# Patient Record
Sex: FEMALE | Race: WHITE | NOT HISPANIC OR LATINO | Employment: UNEMPLOYED | URBAN - METROPOLITAN AREA
[De-identification: names, ages, dates, MRNs, and addresses within clinical notes are randomized per-mention and may not be internally consistent; named-entity substitution may affect disease eponyms.]

---

## 2023-10-03 ENCOUNTER — LAB (OUTPATIENT)
Dept: LAB | Facility: LAB | Age: 28
End: 2023-10-03
Payer: COMMERCIAL

## 2023-10-03 DIAGNOSIS — O16.2 UNSPECIFIED MATERNAL HYPERTENSION, SECOND TRIMESTER (HHS-HCC): Primary | ICD-10-CM

## 2023-10-03 LAB
ERYTHROCYTE [DISTWIDTH] IN BLOOD BY AUTOMATED COUNT: 12.5 % (ref 11.5–14.5)
HCT VFR BLD AUTO: 35.5 % (ref 36–46)
HGB BLD-MCNC: 11.7 G/DL (ref 12–16)
MCH RBC QN AUTO: 27.9 PG (ref 26–34)
MCHC RBC AUTO-ENTMCNC: 33 G/DL (ref 32–36)
MCV RBC AUTO: 85 FL (ref 80–100)
NRBC BLD-RTO: 0 /100 WBCS (ref 0–0)
PLATELET # BLD AUTO: 222 X10*3/UL (ref 150–450)
PMV BLD AUTO: 11.9 FL (ref 7.5–11.5)
RBC # BLD AUTO: 4.19 X10*6/UL (ref 4–5.2)
WBC # BLD AUTO: 16.6 X10*3/UL (ref 4.4–11.3)

## 2023-10-03 PROCEDURE — 82565 ASSAY OF CREATININE: CPT

## 2023-10-03 PROCEDURE — 84156 ASSAY OF PROTEIN URINE: CPT

## 2023-10-03 PROCEDURE — 82570 ASSAY OF URINE CREATININE: CPT

## 2023-10-03 PROCEDURE — 84520 ASSAY OF UREA NITROGEN: CPT

## 2023-10-03 PROCEDURE — 36415 COLL VENOUS BLD VENIPUNCTURE: CPT

## 2023-10-03 PROCEDURE — 84450 TRANSFERASE (AST) (SGOT): CPT

## 2023-10-03 PROCEDURE — 84460 ALANINE AMINO (ALT) (SGPT): CPT

## 2023-10-03 PROCEDURE — 84550 ASSAY OF BLOOD/URIC ACID: CPT

## 2023-10-04 LAB
ALT SERPL W P-5'-P-CCNC: 7 U/L (ref 7–45)
AST SERPL W P-5'-P-CCNC: 11 U/L (ref 9–39)
BUN SERPL-MCNC: 16 MG/DL (ref 6–23)
CREAT SERPL-MCNC: 0.84 MG/DL (ref 0.5–1.05)
CREAT UR-MCNC: 69.9 MG/DL (ref 20–320)
GFR SERPL CREATININE-BSD FRML MDRD: >90 ML/MIN/1.73M*2
PROT UR-ACNC: 7 MG/DL (ref 5–24)
PROT/CREAT UR: 0.1 MG/MG CREAT (ref 0–0.17)
URATE SERPL-MCNC: 7.3 MG/DL (ref 2.3–6.7)

## 2023-11-06 ENCOUNTER — HOSPITAL ENCOUNTER (OUTPATIENT)
Facility: HOSPITAL | Age: 28
Discharge: HOME | End: 2023-11-06
Attending: STUDENT IN AN ORGANIZED HEALTH CARE EDUCATION/TRAINING PROGRAM | Admitting: STUDENT IN AN ORGANIZED HEALTH CARE EDUCATION/TRAINING PROGRAM
Payer: COMMERCIAL

## 2023-11-06 ENCOUNTER — HOSPITAL ENCOUNTER (OUTPATIENT)
Facility: HOSPITAL | Age: 28
End: 2023-11-06
Attending: STUDENT IN AN ORGANIZED HEALTH CARE EDUCATION/TRAINING PROGRAM | Admitting: STUDENT IN AN ORGANIZED HEALTH CARE EDUCATION/TRAINING PROGRAM
Payer: COMMERCIAL

## 2023-11-06 VITALS
DIASTOLIC BLOOD PRESSURE: 86 MMHG | SYSTOLIC BLOOD PRESSURE: 143 MMHG | OXYGEN SATURATION: 97 % | TEMPERATURE: 98.2 F | RESPIRATION RATE: 18 BRPM | HEART RATE: 80 BPM

## 2023-11-06 LAB
ALBUMIN SERPL-MCNC: 3.4 G/DL (ref 3.5–5)
ALP BLD-CCNC: 91 U/L (ref 35–125)
ALT SERPL-CCNC: 12 U/L (ref 5–40)
ANION GAP SERPL CALC-SCNC: 9 MMOL/L
AST SERPL-CCNC: 16 U/L (ref 5–40)
BILIRUB SERPL-MCNC: <0.2 MG/DL (ref 0.1–1.2)
BUN SERPL-MCNC: 12 MG/DL (ref 8–25)
CALCIUM SERPL-MCNC: 8.9 MG/DL (ref 8.5–10.4)
CHLORIDE SERPL-SCNC: 102 MMOL/L (ref 97–107)
CO2 SERPL-SCNC: 24 MMOL/L (ref 24–31)
CREAT SERPL-MCNC: 0.9 MG/DL (ref 0.4–1.6)
CREAT UR-MCNC: 182.4 MG/DL
ERYTHROCYTE [DISTWIDTH] IN BLOOD BY AUTOMATED COUNT: 12.5 % (ref 11.5–14.5)
GFR SERPL CREATININE-BSD FRML MDRD: >90 ML/MIN/1.73M*2
GLUCOSE SERPL-MCNC: 107 MG/DL (ref 65–99)
HCT VFR BLD AUTO: 32.3 % (ref 36–46)
HGB BLD-MCNC: 10.8 G/DL (ref 12–16)
LDH SERPL-CCNC: 178 U/L (ref 91–227)
MCH RBC QN AUTO: 28.3 PG (ref 26–34)
MCHC RBC AUTO-ENTMCNC: 33.4 G/DL (ref 32–36)
MCV RBC AUTO: 85 FL (ref 80–100)
NRBC BLD-RTO: 0 /100 WBCS (ref 0–0)
PLATELET # BLD AUTO: 210 X10*3/UL (ref 150–450)
POTASSIUM SERPL-SCNC: 3.7 MMOL/L (ref 3.4–5.1)
PROT SERPL-MCNC: 5.7 G/DL (ref 5.9–7.9)
PROT UR-MCNC: 22 MG/DL
PROT/CREAT UR: 0.12 MG/MG CREAT
RBC # BLD AUTO: 3.82 X10*6/UL (ref 4–5.2)
SODIUM SERPL-SCNC: 135 MMOL/L (ref 133–145)
WBC # BLD AUTO: 12.8 X10*3/UL (ref 4.4–11.3)

## 2023-11-06 PROCEDURE — 99213 OFFICE O/P EST LOW 20 MIN: CPT

## 2023-11-06 PROCEDURE — 83615 LACTATE (LD) (LDH) ENZYME: CPT | Performed by: STUDENT IN AN ORGANIZED HEALTH CARE EDUCATION/TRAINING PROGRAM

## 2023-11-06 PROCEDURE — 85027 COMPLETE CBC AUTOMATED: CPT | Performed by: STUDENT IN AN ORGANIZED HEALTH CARE EDUCATION/TRAINING PROGRAM

## 2023-11-06 PROCEDURE — 36415 COLL VENOUS BLD VENIPUNCTURE: CPT | Performed by: STUDENT IN AN ORGANIZED HEALTH CARE EDUCATION/TRAINING PROGRAM

## 2023-11-06 PROCEDURE — 80053 COMPREHEN METABOLIC PANEL: CPT | Performed by: STUDENT IN AN ORGANIZED HEALTH CARE EDUCATION/TRAINING PROGRAM

## 2023-11-06 PROCEDURE — 84156 ASSAY OF PROTEIN URINE: CPT | Performed by: STUDENT IN AN ORGANIZED HEALTH CARE EDUCATION/TRAINING PROGRAM

## 2023-11-06 PROCEDURE — 82570 ASSAY OF URINE CREATININE: CPT | Performed by: STUDENT IN AN ORGANIZED HEALTH CARE EDUCATION/TRAINING PROGRAM

## 2023-11-06 RX ORDER — LIDOCAINE HYDROCHLORIDE 10 MG/ML
0.5 INJECTION INFILTRATION; PERINEURAL ONCE AS NEEDED
Status: DISCONTINUED | OUTPATIENT
Start: 2023-11-06 | End: 2023-11-06 | Stop reason: HOSPADM

## 2023-11-06 RX ORDER — NIFEDIPINE 30 MG/1
30 TABLET, FILM COATED, EXTENDED RELEASE ORAL
COMMUNITY
End: 2023-12-04 | Stop reason: HOSPADM

## 2023-11-06 RX ORDER — ONDANSETRON HYDROCHLORIDE 2 MG/ML
4 INJECTION, SOLUTION INTRAVENOUS EVERY 6 HOURS PRN
Status: DISCONTINUED | OUTPATIENT
Start: 2023-11-06 | End: 2023-11-06 | Stop reason: HOSPADM

## 2023-11-06 RX ORDER — ONDANSETRON 4 MG/1
4 TABLET, FILM COATED ORAL EVERY 6 HOURS PRN
Status: DISCONTINUED | OUTPATIENT
Start: 2023-11-06 | End: 2023-11-06 | Stop reason: HOSPADM

## 2023-11-06 RX ORDER — LABETALOL HYDROCHLORIDE 5 MG/ML
20 INJECTION, SOLUTION INTRAVENOUS ONCE AS NEEDED
Status: DISCONTINUED | OUTPATIENT
Start: 2023-11-06 | End: 2023-11-06 | Stop reason: HOSPADM

## 2023-11-06 RX ORDER — NIFEDIPINE 10 MG/1
10 CAPSULE ORAL ONCE AS NEEDED
Status: DISCONTINUED | OUTPATIENT
Start: 2023-11-06 | End: 2023-11-06 | Stop reason: HOSPADM

## 2023-11-06 RX ORDER — ASPIRIN 81 MG/1
81 TABLET ORAL DAILY
COMMUNITY
End: 2023-12-04 | Stop reason: HOSPADM

## 2023-11-06 RX ORDER — HYDRALAZINE HYDROCHLORIDE 20 MG/ML
5 INJECTION INTRAMUSCULAR; INTRAVENOUS ONCE AS NEEDED
Status: DISCONTINUED | OUTPATIENT
Start: 2023-11-06 | End: 2023-11-06 | Stop reason: HOSPADM

## 2023-11-06 ASSESSMENT — PAIN SCALES - GENERAL: PAINLEVEL_OUTOF10: 3

## 2023-11-07 NOTE — H&P
Obstetrical Triage History and Physical    27yo  at 31w5d presents for elevated BP at home. Known hypertension starting at 20 weeks, was started on 30mg Nifedipine XL at that time. Reports BP has been mostly mild range at home, gets higher readings at work, was 170 systolic today, prompting presentation. Patient reports mild headache; has also had URI for past several days and has been taking Tylenol Cold&Flu for this. Denies ctx, LOF, VB, vision changes, epigastric pain. +fetal movement.      Obstetrical History   OB History    Para Term  AB Living   2 1   1   1   SAB IAB Ectopic Multiple Live Births           1      # Outcome Date GA Lbr Guillermo/2nd Weight Sex Delivery Anes PTL Lv   2 Current            1                 Past Medical History  Past Medical History:   Diagnosis Date    Acute tonsillitis, unspecified 2013    Acute tonsillitis        Past Surgical History   No past surgical history on file.    Allergies  Patient has no known allergies.     Medications  Nifedipine XL 30mg qAM    Objective    Last Vitals  Temp Pulse Resp BP MAP O2 Sat   36.8 °C (98.2 °F) 80 18 (!) 143/86   97 %     Physical Examination  AAOx3, NAD  Abd soft, gravid, NT  Ext no edema    FHT = Category 1  [place] = quiet      Lab Review  Pre-E labs reviewed and stable from most recent labwork on 10/3. Urine pr:cr = 0.12.    Assessment/Plan  31 weeks with known HTN in pregnancy. As above, preE labs stable from previous. Serial BP during triage observation = 144/90, 145/88, 137/83, 141/85, and 143/86. Patient deemed stable for discharge. She was counseled to keep her scheduled office visit , and was given a note to remain off work until them. Precautions reviewed.

## 2023-11-29 ENCOUNTER — HOSPITAL ENCOUNTER (OUTPATIENT)
Facility: HOSPITAL | Age: 28
Discharge: ANOTHER HOSPITAL | End: 2023-11-29
Attending: STUDENT IN AN ORGANIZED HEALTH CARE EDUCATION/TRAINING PROGRAM | Admitting: STUDENT IN AN ORGANIZED HEALTH CARE EDUCATION/TRAINING PROGRAM
Payer: COMMERCIAL

## 2023-11-29 ENCOUNTER — HOSPITAL ENCOUNTER (INPATIENT)
Facility: HOSPITAL | Age: 28
LOS: 5 days | Discharge: HOME | End: 2023-12-04
Attending: OBSTETRICS & GYNECOLOGY | Admitting: OBSTETRICS & GYNECOLOGY
Payer: COMMERCIAL

## 2023-11-29 VITALS
HEIGHT: 67 IN | WEIGHT: 241.4 LBS | OXYGEN SATURATION: 97 % | HEART RATE: 83 BPM | TEMPERATURE: 98.4 F | RESPIRATION RATE: 18 BRPM | DIASTOLIC BLOOD PRESSURE: 75 MMHG | SYSTOLIC BLOOD PRESSURE: 139 MMHG | BODY MASS INDEX: 37.89 KG/M2

## 2023-11-29 DIAGNOSIS — O14.13 SEVERE PRE-ECLAMPSIA IN THIRD TRIMESTER (HHS-HCC): ICD-10-CM

## 2023-11-29 DIAGNOSIS — O14.10 SEVERE PRE-ECLAMPSIA, ANTEPARTUM (HHS-HCC): ICD-10-CM

## 2023-11-29 LAB
ABO GROUP (TYPE) IN BLOOD: NORMAL
ALBUMIN SERPL-MCNC: 3.3 G/DL (ref 3.5–5)
ALP BLD-CCNC: 101 U/L (ref 35–125)
ALT SERPL-CCNC: 8 U/L (ref 5–40)
ANION GAP SERPL CALC-SCNC: 10 MMOL/L
ANTIBODY SCREEN: NORMAL
AST SERPL-CCNC: 13 U/L (ref 5–40)
BILIRUB SERPL-MCNC: 0.3 MG/DL (ref 0.1–1.2)
BUN SERPL-MCNC: 12 MG/DL (ref 8–25)
CALCIUM SERPL-MCNC: 8.9 MG/DL (ref 8.5–10.4)
CHLORIDE SERPL-SCNC: 105 MMOL/L (ref 97–107)
CO2 SERPL-SCNC: 21 MMOL/L (ref 24–31)
CREAT SERPL-MCNC: 0.9 MG/DL (ref 0.4–1.6)
CREAT UR-MCNC: 97.7 MG/DL
ERYTHROCYTE [DISTWIDTH] IN BLOOD BY AUTOMATED COUNT: 12.7 % (ref 11.5–14.5)
GFR SERPL CREATININE-BSD FRML MDRD: >90 ML/MIN/1.73M*2
GLUCOSE SERPL-MCNC: 85 MG/DL (ref 65–99)
HCT VFR BLD AUTO: 31.9 % (ref 36–46)
HGB BLD-MCNC: 10.5 G/DL (ref 12–16)
LDH SERPL-CCNC: 165 U/L (ref 91–227)
MCH RBC QN AUTO: 28.1 PG (ref 26–34)
MCHC RBC AUTO-ENTMCNC: 32.9 G/DL (ref 32–36)
MCV RBC AUTO: 85 FL (ref 80–100)
NRBC BLD-RTO: 0 /100 WBCS (ref 0–0)
PLATELET # BLD AUTO: 197 X10*3/UL (ref 150–450)
POTASSIUM SERPL-SCNC: 4.1 MMOL/L (ref 3.4–5.1)
PROT SERPL-MCNC: 5.9 G/DL (ref 5.9–7.9)
PROT UR-MCNC: 16 MG/DL
PROT/CREAT UR: 0.16 MG/MG CREAT
RBC # BLD AUTO: 3.74 X10*6/UL (ref 4–5.2)
RH FACTOR (ANTIGEN D): NORMAL
SODIUM SERPL-SCNC: 136 MMOL/L (ref 133–145)
URATE SERPL-MCNC: 8 MG/DL (ref 2.5–6.8)
WBC # BLD AUTO: 14.7 X10*3/UL (ref 4.4–11.3)

## 2023-11-29 PROCEDURE — 2500000004 HC RX 250 GENERAL PHARMACY W/ HCPCS (ALT 636 FOR OP/ED): Performed by: STUDENT IN AN ORGANIZED HEALTH CARE EDUCATION/TRAINING PROGRAM

## 2023-11-29 PROCEDURE — 1120000001 HC OB PRIVATE ROOM DAILY

## 2023-11-29 PROCEDURE — 83615 LACTATE (LD) (LDH) ENZYME: CPT | Performed by: STUDENT IN AN ORGANIZED HEALTH CARE EDUCATION/TRAINING PROGRAM

## 2023-11-29 PROCEDURE — 96372 THER/PROPH/DIAG INJ SC/IM: CPT | Performed by: STUDENT IN AN ORGANIZED HEALTH CARE EDUCATION/TRAINING PROGRAM

## 2023-11-29 PROCEDURE — 87081 CULTURE SCREEN ONLY: CPT | Mod: TRILAB | Performed by: STUDENT IN AN ORGANIZED HEALTH CARE EDUCATION/TRAINING PROGRAM

## 2023-11-29 PROCEDURE — 86901 BLOOD TYPING SEROLOGIC RH(D): CPT | Performed by: STUDENT IN AN ORGANIZED HEALTH CARE EDUCATION/TRAINING PROGRAM

## 2023-11-29 PROCEDURE — 84550 ASSAY OF BLOOD/URIC ACID: CPT | Performed by: STUDENT IN AN ORGANIZED HEALTH CARE EDUCATION/TRAINING PROGRAM

## 2023-11-29 PROCEDURE — 85027 COMPLETE CBC AUTOMATED: CPT | Performed by: STUDENT IN AN ORGANIZED HEALTH CARE EDUCATION/TRAINING PROGRAM

## 2023-11-29 PROCEDURE — 82570 ASSAY OF URINE CREATININE: CPT | Performed by: STUDENT IN AN ORGANIZED HEALTH CARE EDUCATION/TRAINING PROGRAM

## 2023-11-29 PROCEDURE — 80053 COMPREHEN METABOLIC PANEL: CPT | Performed by: STUDENT IN AN ORGANIZED HEALTH CARE EDUCATION/TRAINING PROGRAM

## 2023-11-29 PROCEDURE — 36415 COLL VENOUS BLD VENIPUNCTURE: CPT | Performed by: STUDENT IN AN ORGANIZED HEALTH CARE EDUCATION/TRAINING PROGRAM

## 2023-11-29 RX ORDER — CALCIUM GLUCONATE 98 MG/ML
1 INJECTION, SOLUTION INTRAVENOUS ONCE AS NEEDED
Status: DISCONTINUED | OUTPATIENT
Start: 2023-11-29 | End: 2023-11-29 | Stop reason: HOSPADM

## 2023-11-29 RX ORDER — LABETALOL HYDROCHLORIDE 5 MG/ML
40 INJECTION, SOLUTION INTRAVENOUS ONCE AS NEEDED
Status: DISCONTINUED | OUTPATIENT
Start: 2023-11-29 | End: 2023-11-29 | Stop reason: HOSPADM

## 2023-11-29 RX ORDER — BETAMETHASONE SODIUM PHOSPHATE AND BETAMETHASONE ACETATE 3; 3 MG/ML; MG/ML
12 INJECTION, SUSPENSION INTRA-ARTICULAR; INTRALESIONAL; INTRAMUSCULAR; SOFT TISSUE ONCE
Status: COMPLETED | OUTPATIENT
Start: 2023-11-29 | End: 2023-11-29

## 2023-11-29 RX ORDER — HYDRALAZINE HYDROCHLORIDE 20 MG/ML
5 INJECTION INTRAMUSCULAR; INTRAVENOUS ONCE AS NEEDED
Status: DISCONTINUED | OUTPATIENT
Start: 2023-11-29 | End: 2023-11-29 | Stop reason: HOSPADM

## 2023-11-29 RX ORDER — ONDANSETRON HYDROCHLORIDE 2 MG/ML
4 INJECTION, SOLUTION INTRAVENOUS EVERY 6 HOURS PRN
Status: DISCONTINUED | OUTPATIENT
Start: 2023-11-29 | End: 2023-11-29 | Stop reason: HOSPADM

## 2023-11-29 RX ORDER — MAGNESIUM SULFATE HEPTAHYDRATE 40 MG/ML
2 INJECTION, SOLUTION INTRAVENOUS CONTINUOUS
Status: DISCONTINUED | OUTPATIENT
Start: 2023-11-29 | End: 2023-11-29 | Stop reason: HOSPADM

## 2023-11-29 RX ORDER — LABETALOL HYDROCHLORIDE 5 MG/ML
40 INJECTION, SOLUTION INTRAVENOUS ONCE
Status: COMPLETED | OUTPATIENT
Start: 2023-11-29 | End: 2023-11-29

## 2023-11-29 RX ORDER — LABETALOL HYDROCHLORIDE 5 MG/ML
80 INJECTION, SOLUTION INTRAVENOUS ONCE AS NEEDED
Status: COMPLETED | OUTPATIENT
Start: 2023-11-29 | End: 2023-11-29

## 2023-11-29 RX ORDER — SODIUM CHLORIDE, SODIUM LACTATE, POTASSIUM CHLORIDE, CALCIUM CHLORIDE 600; 310; 30; 20 MG/100ML; MG/100ML; MG/100ML; MG/100ML
75 INJECTION, SOLUTION INTRAVENOUS CONTINUOUS
Status: DISCONTINUED | OUTPATIENT
Start: 2023-11-29 | End: 2023-11-29 | Stop reason: HOSPADM

## 2023-11-29 RX ORDER — LABETALOL HYDROCHLORIDE 5 MG/ML
20 INJECTION, SOLUTION INTRAVENOUS ONCE AS NEEDED
Status: COMPLETED | OUTPATIENT
Start: 2023-11-29 | End: 2023-11-29

## 2023-11-29 RX ORDER — ONDANSETRON 4 MG/1
4 TABLET, FILM COATED ORAL EVERY 6 HOURS PRN
Status: DISCONTINUED | OUTPATIENT
Start: 2023-11-29 | End: 2023-11-29 | Stop reason: HOSPADM

## 2023-11-29 RX ORDER — HYDRALAZINE HYDROCHLORIDE 20 MG/ML
10 INJECTION INTRAMUSCULAR; INTRAVENOUS ONCE AS NEEDED
Status: DISCONTINUED | OUTPATIENT
Start: 2023-11-29 | End: 2023-11-29 | Stop reason: HOSPADM

## 2023-11-29 RX ORDER — NIFEDIPINE 10 MG/1
10 CAPSULE ORAL ONCE AS NEEDED
Status: COMPLETED | OUTPATIENT
Start: 2023-11-29 | End: 2023-11-29

## 2023-11-29 RX ADMIN — LABETALOL HYDROCHLORIDE 80 MG: 5 INJECTION INTRAVENOUS at 19:01

## 2023-11-29 RX ADMIN — LABETALOL HYDROCHLORIDE 20 MG: 5 INJECTION INTRAVENOUS at 18:01

## 2023-11-29 RX ADMIN — LABETALOL HYDROCHLORIDE 40 MG: 5 INJECTION INTRAVENOUS at 18:33

## 2023-11-29 RX ADMIN — BETAMETHASONE SODIUM PHOSPHATE AND BETAMETHASONE ACETATE 12 MG: 3; 3 INJECTION, SUSPENSION INTRA-ARTICULAR; INTRALESIONAL; INTRAMUSCULAR at 20:32

## 2023-11-29 RX ADMIN — SODIUM CHLORIDE, POTASSIUM CHLORIDE, SODIUM LACTATE AND CALCIUM CHLORIDE 75 ML/HR: 600; 310; 30; 20 INJECTION, SOLUTION INTRAVENOUS at 19:00

## 2023-11-29 RX ADMIN — MAGNESIUM SULFATE IN WATER 2 G/HR: 40 INJECTION, SOLUTION INTRAVENOUS at 17:51

## 2023-11-29 RX ADMIN — NIFEDIPINE 10 MG: 10 CAPSULE ORAL at 17:06

## 2023-11-29 SDOH — SOCIAL STABILITY: SOCIAL INSECURITY: PHYSICAL ABUSE: DENIES

## 2023-11-29 SDOH — SOCIAL STABILITY: SOCIAL INSECURITY: VERBAL ABUSE: DENIES

## 2023-11-29 SDOH — ECONOMIC STABILITY: HOUSING INSECURITY: DO YOU FEEL UNSAFE GOING BACK TO THE PLACE WHERE YOU ARE LIVING?: NO

## 2023-11-29 SDOH — SOCIAL STABILITY: SOCIAL INSECURITY: ARE YOU OR HAVE YOU BEEN THREATENED OR ABUSED PHYSICALLY, EMOTIONALLY, OR SEXUALLY BY ANYONE?: NO

## 2023-11-29 SDOH — HEALTH STABILITY: MENTAL HEALTH: HAVE YOU USED ANY SUBSTANCES (CANABIS, COCAINE, HEROIN, HALLUCINOGENS, INHALANTS, ETC.) IN THE PAST 12 MONTHS?: NO

## 2023-11-29 SDOH — SOCIAL STABILITY: SOCIAL INSECURITY: ARE THERE ANY APPARENT SIGNS OF INJURIES/BEHAVIORS THAT COULD BE RELATED TO ABUSE/NEGLECT?: NO

## 2023-11-29 SDOH — SOCIAL STABILITY: SOCIAL INSECURITY: DOES ANYONE TRY TO KEEP YOU FROM HAVING/CONTACTING OTHER FRIENDS OR DOING THINGS OUTSIDE YOUR HOME?: NO

## 2023-11-29 SDOH — HEALTH STABILITY: MENTAL HEALTH: NON-SPECIFIC ACTIVE SUICIDAL THOUGHTS (PAST 1 MONTH): NO

## 2023-11-29 SDOH — HEALTH STABILITY: MENTAL HEALTH: SUICIDAL BEHAVIOR (LIFETIME): NO

## 2023-11-29 SDOH — HEALTH STABILITY: MENTAL HEALTH: WISH TO BE DEAD (PAST 1 MONTH): NO

## 2023-11-29 SDOH — SOCIAL STABILITY: SOCIAL INSECURITY: HAS ANYONE EVER THREATENED TO HURT YOUR FAMILY OR YOUR PETS?: NO

## 2023-11-29 SDOH — SOCIAL STABILITY: SOCIAL INSECURITY: DO YOU FEEL ANYONE HAS EXPLOITED OR TAKEN ADVANTAGE OF YOU FINANCIALLY OR OF YOUR PERSONAL PROPERTY?: NO

## 2023-11-29 SDOH — SOCIAL STABILITY: SOCIAL INSECURITY: ABUSE SCREEN: ADULT

## 2023-11-29 SDOH — SOCIAL STABILITY: SOCIAL INSECURITY: HAVE YOU HAD THOUGHTS OF HARMING ANYONE ELSE?: NO

## 2023-11-29 SDOH — HEALTH STABILITY: MENTAL HEALTH: WERE YOU ABLE TO COMPLETE ALL THE BEHAVIORAL HEALTH SCREENINGS?: YES

## 2023-11-29 SDOH — HEALTH STABILITY: MENTAL HEALTH: HAVE YOU USED ANY PRESCRIPTION DRUGS OTHER THAN PRESCRIBED IN THE PAST 12 MONTHS?: NO

## 2023-11-29 ASSESSMENT — LIFESTYLE VARIABLES
HOW OFTEN DO YOU HAVE 6 OR MORE DRINKS ON ONE OCCASION: NEVER
AUDIT-C TOTAL SCORE: 0
HOW OFTEN DO YOU HAVE A DRINK CONTAINING ALCOHOL: NEVER
SKIP TO QUESTIONS 9-10: 1
HOW MANY STANDARD DRINKS CONTAINING ALCOHOL DO YOU HAVE ON A TYPICAL DAY: PATIENT DOES NOT DRINK
AUDIT-C TOTAL SCORE: 0
SKIP TO QUESTIONS 9-10: 1
HOW MANY STANDARD DRINKS CONTAINING ALCOHOL DO YOU HAVE ON A TYPICAL DAY: PATIENT DOES NOT DRINK
HOW OFTEN DO YOU HAVE A DRINK CONTAINING ALCOHOL: NEVER
AUDIT-C TOTAL SCORE: 0
AUDIT-C TOTAL SCORE: 0
HOW OFTEN DO YOU HAVE 6 OR MORE DRINKS ON ONE OCCASION: NEVER

## 2023-11-29 ASSESSMENT — PATIENT HEALTH QUESTIONNAIRE - PHQ9
1. LITTLE INTEREST OR PLEASURE IN DOING THINGS: NOT AT ALL
1. LITTLE INTEREST OR PLEASURE IN DOING THINGS: NOT AT ALL
2. FEELING DOWN, DEPRESSED OR HOPELESS: NOT AT ALL
2. FEELING DOWN, DEPRESSED OR HOPELESS: NOT AT ALL
SUM OF ALL RESPONSES TO PHQ9 QUESTIONS 1 & 2: 0
SUM OF ALL RESPONSES TO PHQ9 QUESTIONS 1 & 2: 0

## 2023-11-29 ASSESSMENT — PAIN SCALES - GENERAL
PAINLEVEL_OUTOF10: 3
PAINLEVEL_OUTOF10: 0 - NO PAIN
PAINLEVEL_OUTOF10: 3
PAINLEVEL_OUTOF10: 0 - NO PAIN
PAINLEVEL_OUTOF10: 0 - NO PAIN
PAINLEVEL: 0 - NO PAIN

## 2023-11-29 ASSESSMENT — ACTIVITIES OF DAILY LIVING (ADL)
LACK_OF_TRANSPORTATION: NO
LACK_OF_TRANSPORTATION: NO

## 2023-11-30 ENCOUNTER — ANESTHESIA (OUTPATIENT)
Dept: OBSTETRICS AND GYNECOLOGY | Facility: HOSPITAL | Age: 28
End: 2023-11-30
Payer: COMMERCIAL

## 2023-11-30 ENCOUNTER — ANESTHESIA EVENT (OUTPATIENT)
Dept: OBSTETRICS AND GYNECOLOGY | Facility: HOSPITAL | Age: 28
End: 2023-11-30
Payer: COMMERCIAL

## 2023-11-30 PROBLEM — O14.13 SEVERE PRE-ECLAMPSIA IN THIRD TRIMESTER (HHS-HCC): Status: ACTIVE | Noted: 2023-11-30

## 2023-11-30 LAB
ABO GROUP (TYPE) IN BLOOD: NORMAL
ALBUMIN SERPL BCP-MCNC: 3 G/DL (ref 3.4–5)
ALP SERPL-CCNC: 99 U/L (ref 33–110)
ALT SERPL W P-5'-P-CCNC: 8 U/L (ref 7–45)
ANION GAP SERPL CALC-SCNC: 18 MMOL/L (ref 10–20)
ANTIBODY SCREEN: NORMAL
AST SERPL W P-5'-P-CCNC: 13 U/L (ref 9–39)
BILIRUB SERPL-MCNC: 0.4 MG/DL (ref 0–1.2)
BUN SERPL-MCNC: 11 MG/DL (ref 6–23)
CALCIUM SERPL-MCNC: 8 MG/DL (ref 8.6–10.6)
CHLORIDE SERPL-SCNC: 103 MMOL/L (ref 98–107)
CO2 SERPL-SCNC: 19 MMOL/L (ref 21–32)
CREAT SERPL-MCNC: 0.9 MG/DL (ref 0.5–1.05)
ERYTHROCYTE [DISTWIDTH] IN BLOOD BY AUTOMATED COUNT: 12.7 % (ref 11.5–14.5)
GFR SERPL CREATININE-BSD FRML MDRD: >90 ML/MIN/1.73M*2
GLUCOSE SERPL-MCNC: 131 MG/DL (ref 74–99)
HCT VFR BLD AUTO: 39.6 % (ref 36–46)
HGB BLD-MCNC: 12.3 G/DL (ref 12–16)
MCH RBC QN AUTO: 27.6 PG (ref 26–34)
MCHC RBC AUTO-ENTMCNC: 31.1 G/DL (ref 32–36)
MCV RBC AUTO: 89 FL (ref 80–100)
NRBC BLD-RTO: 0 /100 WBCS (ref 0–0)
PLATELET # BLD AUTO: 204 X10*3/UL (ref 150–450)
POTASSIUM SERPL-SCNC: 4.1 MMOL/L (ref 3.5–5.3)
PROT SERPL-MCNC: 5.5 G/DL (ref 6.4–8.2)
RBC # BLD AUTO: 4.46 X10*6/UL (ref 4–5.2)
RH FACTOR (ANTIGEN D): NORMAL
SODIUM SERPL-SCNC: 136 MMOL/L (ref 136–145)
T PALLIDUM AB SER QL: NONREACTIVE
WBC # BLD AUTO: 15.9 X10*3/UL (ref 4.4–11.3)

## 2023-11-30 PROCEDURE — 51702 INSERT TEMP BLADDER CATH: CPT

## 2023-11-30 PROCEDURE — 99199 UNLISTED SPECIAL SVC PX/RPRT: CPT

## 2023-11-30 PROCEDURE — 10907ZC DRAINAGE OF AMNIOTIC FLUID, THERAPEUTIC FROM PRODUCTS OF CONCEPTION, VIA NATURAL OR ARTIFICIAL OPENING: ICD-10-PCS | Performed by: OBSTETRICS & GYNECOLOGY

## 2023-11-30 PROCEDURE — 59050 FETAL MONITOR W/REPORT: CPT

## 2023-11-30 PROCEDURE — 80053 COMPREHEN METABOLIC PANEL: CPT

## 2023-11-30 PROCEDURE — 7210000002 HC LABOR PER HOUR

## 2023-11-30 PROCEDURE — 88307 TISSUE EXAM BY PATHOLOGIST: CPT | Performed by: PATHOLOGY

## 2023-11-30 PROCEDURE — 59409 OBSTETRICAL CARE: CPT | Performed by: STUDENT IN AN ORGANIZED HEALTH CARE EDUCATION/TRAINING PROGRAM

## 2023-11-30 PROCEDURE — 01967 NEURAXL LBR ANES VAG DLVR: CPT

## 2023-11-30 PROCEDURE — 36415 COLL VENOUS BLD VENIPUNCTURE: CPT

## 2023-11-30 PROCEDURE — 3E033VJ INTRODUCTION OF OTHER HORMONE INTO PERIPHERAL VEIN, PERCUTANEOUS APPROACH: ICD-10-PCS | Performed by: OBSTETRICS & GYNECOLOGY

## 2023-11-30 PROCEDURE — 88307 TISSUE EXAM BY PATHOLOGIST: CPT | Mod: TC,SUR

## 2023-11-30 PROCEDURE — 59409 OBSTETRICAL CARE: CPT | Performed by: OBSTETRICS & GYNECOLOGY

## 2023-11-30 PROCEDURE — 1100000001 HC PRIVATE ROOM DAILY

## 2023-11-30 PROCEDURE — 86780 TREPONEMA PALLIDUM: CPT

## 2023-11-30 PROCEDURE — 01967 NEURAXL LBR ANES VAG DLVR: CPT | Performed by: ANESTHESIOLOGY

## 2023-11-30 PROCEDURE — 2500000004 HC RX 250 GENERAL PHARMACY W/ HCPCS (ALT 636 FOR OP/ED)

## 2023-11-30 PROCEDURE — 85027 COMPLETE CBC AUTOMATED: CPT

## 2023-11-30 PROCEDURE — 3700000001 HC GENERAL ANESTHESIA TIME - INITIAL BASE CHARGE: Performed by: ANESTHESIOLOGY

## 2023-11-30 PROCEDURE — 2500000005 HC RX 250 GENERAL PHARMACY W/O HCPCS

## 2023-11-30 PROCEDURE — 86901 BLOOD TYPING SEROLOGIC RH(D): CPT

## 2023-11-30 PROCEDURE — 2500000001 HC RX 250 WO HCPCS SELF ADMINISTERED DRUGS (ALT 637 FOR MEDICARE OP)

## 2023-11-30 PROCEDURE — 3700000002 HC GENERAL ANESTHESIA TIME - EACH INCREMENTAL 1 MINUTE: Performed by: ANESTHESIOLOGY

## 2023-11-30 PROCEDURE — 7100000016 HC LABOR RECOVERY PER HOUR

## 2023-11-30 RX ORDER — SODIUM CHLORIDE, SODIUM LACTATE, POTASSIUM CHLORIDE, CALCIUM CHLORIDE 600; 310; 30; 20 MG/100ML; MG/100ML; MG/100ML; MG/100ML
125 INJECTION, SOLUTION INTRAVENOUS CONTINUOUS
Status: DISCONTINUED | OUTPATIENT
Start: 2023-11-30 | End: 2023-11-30

## 2023-11-30 RX ORDER — SODIUM CHLORIDE, SODIUM LACTATE, POTASSIUM CHLORIDE, CALCIUM CHLORIDE 600; 310; 30; 20 MG/100ML; MG/100ML; MG/100ML; MG/100ML
75 INJECTION, SOLUTION INTRAVENOUS CONTINUOUS
Status: DISCONTINUED | OUTPATIENT
Start: 2023-11-30 | End: 2023-12-01

## 2023-11-30 RX ORDER — METHYLERGONOVINE MALEATE 0.2 MG/ML
0.2 INJECTION INTRAVENOUS ONCE AS NEEDED
Status: DISCONTINUED | OUTPATIENT
Start: 2023-11-30 | End: 2023-11-30

## 2023-11-30 RX ORDER — LIDOCAINE HYDROCHLORIDE 10 MG/ML
30 INJECTION INFILTRATION; PERINEURAL ONCE AS NEEDED
Status: DISCONTINUED | OUTPATIENT
Start: 2023-11-30 | End: 2023-11-30

## 2023-11-30 RX ORDER — HYDRALAZINE HYDROCHLORIDE 20 MG/ML
5 INJECTION INTRAMUSCULAR; INTRAVENOUS ONCE AS NEEDED
Status: DISCONTINUED | OUTPATIENT
Start: 2023-11-30 | End: 2023-12-01

## 2023-11-30 RX ORDER — ENOXAPARIN SODIUM 100 MG/ML
40 INJECTION SUBCUTANEOUS EVERY 24 HOURS
Status: DISCONTINUED | OUTPATIENT
Start: 2023-12-01 | End: 2023-12-01

## 2023-11-30 RX ORDER — CARBOPROST TROMETHAMINE 250 UG/ML
250 INJECTION, SOLUTION INTRAMUSCULAR ONCE AS NEEDED
Status: DISCONTINUED | OUTPATIENT
Start: 2023-11-30 | End: 2023-12-01

## 2023-11-30 RX ORDER — NIFEDIPINE 30 MG/1
30 TABLET, FILM COATED, EXTENDED RELEASE ORAL EVERY 24 HOURS
Status: DISCONTINUED | OUTPATIENT
Start: 2023-11-30 | End: 2023-12-01

## 2023-11-30 RX ORDER — MAGNESIUM SULFATE HEPTAHYDRATE 40 MG/ML
2 INJECTION, SOLUTION INTRAVENOUS CONTINUOUS
Status: DISCONTINUED | OUTPATIENT
Start: 2023-11-30 | End: 2023-12-01

## 2023-11-30 RX ORDER — CARBOPROST TROMETHAMINE 250 UG/ML
250 INJECTION, SOLUTION INTRAMUSCULAR ONCE AS NEEDED
Status: DISCONTINUED | OUTPATIENT
Start: 2023-11-30 | End: 2023-11-30

## 2023-11-30 RX ORDER — LOPERAMIDE HYDROCHLORIDE 2 MG/1
4 CAPSULE ORAL EVERY 2 HOUR PRN
Status: DISCONTINUED | OUTPATIENT
Start: 2023-11-30 | End: 2023-11-30

## 2023-11-30 RX ORDER — METOCLOPRAMIDE 10 MG/1
10 TABLET ORAL EVERY 6 HOURS PRN
Status: DISCONTINUED | OUTPATIENT
Start: 2023-11-30 | End: 2023-11-30

## 2023-11-30 RX ORDER — ONDANSETRON HYDROCHLORIDE 2 MG/ML
4 INJECTION, SOLUTION INTRAVENOUS EVERY 6 HOURS PRN
Status: DISCONTINUED | OUTPATIENT
Start: 2023-11-30 | End: 2023-12-01

## 2023-11-30 RX ORDER — METOCLOPRAMIDE HYDROCHLORIDE 5 MG/ML
10 INJECTION INTRAMUSCULAR; INTRAVENOUS EVERY 6 HOURS PRN
Status: DISCONTINUED | OUTPATIENT
Start: 2023-11-30 | End: 2023-11-30

## 2023-11-30 RX ORDER — ONDANSETRON 4 MG/1
4 TABLET, FILM COATED ORAL EVERY 6 HOURS PRN
Status: DISCONTINUED | OUTPATIENT
Start: 2023-11-30 | End: 2023-12-01

## 2023-11-30 RX ORDER — DIPHENHYDRAMINE HYDROCHLORIDE 50 MG/ML
25 INJECTION INTRAMUSCULAR; INTRAVENOUS EVERY 6 HOURS PRN
Status: DISCONTINUED | OUTPATIENT
Start: 2023-11-30 | End: 2023-12-01

## 2023-11-30 RX ORDER — DIPHENHYDRAMINE HCL 25 MG
25 CAPSULE ORAL EVERY 6 HOURS PRN
Status: DISCONTINUED | OUTPATIENT
Start: 2023-11-30 | End: 2023-12-01

## 2023-11-30 RX ORDER — OXYTOCIN/0.9 % SODIUM CHLORIDE 30/500 ML
60 PLASTIC BAG, INJECTION (ML) INTRAVENOUS ONCE AS NEEDED
Status: COMPLETED | OUTPATIENT
Start: 2023-11-30 | End: 2023-11-30

## 2023-11-30 RX ORDER — TERBUTALINE SULFATE 1 MG/ML
0.25 INJECTION SUBCUTANEOUS ONCE AS NEEDED
Status: DISCONTINUED | OUTPATIENT
Start: 2023-11-30 | End: 2023-11-30

## 2023-11-30 RX ORDER — IBUPROFEN 600 MG/1
600 TABLET ORAL EVERY 6 HOURS
Status: DISCONTINUED | OUTPATIENT
Start: 2023-11-30 | End: 2023-12-01

## 2023-11-30 RX ORDER — CALCIUM GLUCONATE 98 MG/ML
1 INJECTION, SOLUTION INTRAVENOUS ONCE AS NEEDED
Status: DISCONTINUED | OUTPATIENT
Start: 2023-11-30 | End: 2023-11-30

## 2023-11-30 RX ORDER — ONDANSETRON 4 MG/1
4 TABLET, FILM COATED ORAL EVERY 6 HOURS PRN
Status: DISCONTINUED | OUTPATIENT
Start: 2023-11-30 | End: 2023-11-30

## 2023-11-30 RX ORDER — FENTANYL/BUPIVACAINE/NS/PF 2MCG/ML-.1
PLASTIC BAG, INJECTION (ML) INJECTION AS NEEDED
Status: DISCONTINUED | OUTPATIENT
Start: 2023-11-30 | End: 2023-11-30

## 2023-11-30 RX ORDER — OXYTOCIN 10 [USP'U]/ML
10 INJECTION, SOLUTION INTRAMUSCULAR; INTRAVENOUS ONCE AS NEEDED
Status: DISCONTINUED | OUTPATIENT
Start: 2023-11-30 | End: 2023-11-30

## 2023-11-30 RX ORDER — NIFEDIPINE 10 MG/1
10 CAPSULE ORAL ONCE AS NEEDED
Status: DISCONTINUED | OUTPATIENT
Start: 2023-11-30 | End: 2023-11-30

## 2023-11-30 RX ORDER — HYDRALAZINE HYDROCHLORIDE 20 MG/ML
5 INJECTION INTRAMUSCULAR; INTRAVENOUS ONCE AS NEEDED
Status: DISCONTINUED | OUTPATIENT
Start: 2023-11-30 | End: 2023-11-30

## 2023-11-30 RX ORDER — POLYETHYLENE GLYCOL 3350 17 G/17G
17 POWDER, FOR SOLUTION ORAL 2 TIMES DAILY PRN
Status: DISCONTINUED | OUTPATIENT
Start: 2023-11-30 | End: 2023-12-01

## 2023-11-30 RX ORDER — NIFEDIPINE 10 MG/1
10 CAPSULE ORAL ONCE AS NEEDED
Status: DISCONTINUED | OUTPATIENT
Start: 2023-11-30 | End: 2023-12-01

## 2023-11-30 RX ORDER — METHYLERGONOVINE MALEATE 0.2 MG/ML
0.2 INJECTION INTRAVENOUS ONCE AS NEEDED
Status: DISCONTINUED | OUTPATIENT
Start: 2023-11-30 | End: 2023-12-01

## 2023-11-30 RX ORDER — ACETAMINOPHEN 325 MG/1
975 TABLET ORAL EVERY 6 HOURS
Status: DISCONTINUED | OUTPATIENT
Start: 2023-11-30 | End: 2023-12-01

## 2023-11-30 RX ORDER — OXYTOCIN 10 [USP'U]/ML
10 INJECTION, SOLUTION INTRAMUSCULAR; INTRAVENOUS ONCE AS NEEDED
Status: DISCONTINUED | OUTPATIENT
Start: 2023-11-30 | End: 2023-12-01

## 2023-11-30 RX ORDER — TRANEXAMIC ACID 100 MG/ML
1000 INJECTION, SOLUTION INTRAVENOUS ONCE AS NEEDED
Status: DISCONTINUED | OUTPATIENT
Start: 2023-11-30 | End: 2023-12-01

## 2023-11-30 RX ORDER — PENICILLIN G 3000000 [IU]/50ML
3 INJECTION, SOLUTION INTRAVENOUS EVERY 4 HOURS
Status: DISCONTINUED | OUTPATIENT
Start: 2023-11-30 | End: 2023-11-30

## 2023-11-30 RX ORDER — ONDANSETRON HYDROCHLORIDE 2 MG/ML
4 INJECTION, SOLUTION INTRAVENOUS EVERY 6 HOURS PRN
Status: DISCONTINUED | OUTPATIENT
Start: 2023-11-30 | End: 2023-11-30

## 2023-11-30 RX ORDER — SIMETHICONE 80 MG
80 TABLET,CHEWABLE ORAL 4 TIMES DAILY PRN
Status: DISCONTINUED | OUTPATIENT
Start: 2023-11-30 | End: 2023-12-01

## 2023-11-30 RX ORDER — LABETALOL HYDROCHLORIDE 5 MG/ML
20 INJECTION, SOLUTION INTRAVENOUS ONCE AS NEEDED
Status: DISCONTINUED | OUTPATIENT
Start: 2023-11-30 | End: 2023-12-01

## 2023-11-30 RX ORDER — ADHESIVE BANDAGE
10 BANDAGE TOPICAL
Status: DISCONTINUED | OUTPATIENT
Start: 2023-11-30 | End: 2023-12-01

## 2023-11-30 RX ORDER — MISOPROSTOL 200 UG/1
800 TABLET ORAL ONCE AS NEEDED
Status: DISCONTINUED | OUTPATIENT
Start: 2023-11-30 | End: 2023-12-01

## 2023-11-30 RX ORDER — LABETALOL HYDROCHLORIDE 5 MG/ML
20 INJECTION, SOLUTION INTRAVENOUS ONCE AS NEEDED
Status: DISCONTINUED | OUTPATIENT
Start: 2023-11-30 | End: 2023-11-30

## 2023-11-30 RX ORDER — LOPERAMIDE HYDROCHLORIDE 2 MG/1
4 CAPSULE ORAL EVERY 2 HOUR PRN
Status: DISCONTINUED | OUTPATIENT
Start: 2023-11-30 | End: 2023-12-01

## 2023-11-30 RX ORDER — LIDOCAINE 560 MG/1
1 PATCH PERCUTANEOUS; TOPICAL; TRANSDERMAL
Status: DISCONTINUED | OUTPATIENT
Start: 2023-11-30 | End: 2023-12-01

## 2023-11-30 RX ORDER — BISACODYL 10 MG/1
10 SUPPOSITORY RECTAL DAILY PRN
Status: DISCONTINUED | OUTPATIENT
Start: 2023-11-30 | End: 2023-12-01

## 2023-11-30 RX ORDER — OXYTOCIN/0.9 % SODIUM CHLORIDE 30/500 ML
2-30 PLASTIC BAG, INJECTION (ML) INTRAVENOUS CONTINUOUS
Status: DISCONTINUED | OUTPATIENT
Start: 2023-11-30 | End: 2023-11-30

## 2023-11-30 RX ORDER — MISOPROSTOL 200 UG/1
800 TABLET ORAL ONCE AS NEEDED
Status: DISCONTINUED | OUTPATIENT
Start: 2023-11-30 | End: 2023-11-30

## 2023-11-30 RX ORDER — TRANEXAMIC ACID 100 MG/ML
1000 INJECTION, SOLUTION INTRAVENOUS ONCE AS NEEDED
Status: DISCONTINUED | OUTPATIENT
Start: 2023-11-30 | End: 2023-11-30

## 2023-11-30 RX ORDER — OXYTOCIN/0.9 % SODIUM CHLORIDE 30/500 ML
60 PLASTIC BAG, INJECTION (ML) INTRAVENOUS ONCE AS NEEDED
Status: DISCONTINUED | OUTPATIENT
Start: 2023-11-30 | End: 2023-12-01

## 2023-11-30 RX ADMIN — Medication 14 ML: at 06:51

## 2023-11-30 RX ADMIN — NIFEDIPINE 30 MG: 30 TABLET, FILM COATED, EXTENDED RELEASE ORAL at 09:12

## 2023-11-30 RX ADMIN — ACETAMINOPHEN 975 MG: 325 TABLET ORAL at 15:47

## 2023-11-30 RX ADMIN — IBUPROFEN 600 MG: 600 TABLET, FILM COATED ORAL at 15:47

## 2023-11-30 RX ADMIN — MAGNESIUM SULFATE HEPTAHYDRATE 2 G/HR: 40 INJECTION, SOLUTION INTRAVENOUS at 01:42

## 2023-11-30 RX ADMIN — MAGNESIUM SULFATE HEPTAHYDRATE 2 G/HR: 40 INJECTION, SOLUTION INTRAVENOUS at 12:32

## 2023-11-30 RX ADMIN — PENICILLIN G POTASSIUM 5 MILLION UNITS: 5000000 INJECTION, POWDER, FOR SOLUTION INTRAMUSCULAR; INTRAVENOUS at 01:30

## 2023-11-30 RX ADMIN — Medication 5 ML: at 06:48

## 2023-11-30 RX ADMIN — Medication 60 MILLI-UNITS/MIN: at 13:24

## 2023-11-30 RX ADMIN — Medication 2 MILLI-UNITS/MIN: at 02:00

## 2023-11-30 RX ADMIN — ACETAMINOPHEN 975 MG: 325 TABLET ORAL at 21:38

## 2023-11-30 RX ADMIN — SODIUM CHLORIDE, POTASSIUM CHLORIDE, SODIUM LACTATE AND CALCIUM CHLORIDE 500 ML: 600; 310; 30; 20 INJECTION, SOLUTION INTRAVENOUS at 06:06

## 2023-11-30 RX ADMIN — SODIUM CHLORIDE, POTASSIUM CHLORIDE, SODIUM LACTATE AND CALCIUM CHLORIDE 125 ML/HR: 600; 310; 30; 20 INJECTION, SOLUTION INTRAVENOUS at 13:48

## 2023-11-30 RX ADMIN — IBUPROFEN 600 MG: 600 TABLET, FILM COATED ORAL at 21:38

## 2023-11-30 RX ADMIN — PENICILLIN G 3 MILLION UNITS: 3000000 INJECTION, SOLUTION INTRAVENOUS at 05:34

## 2023-11-30 RX ADMIN — Medication 5 ML: at 06:45

## 2023-11-30 RX ADMIN — PENICILLIN G 3 MILLION UNITS: 3000000 INJECTION, SOLUTION INTRAVENOUS at 09:13

## 2023-11-30 RX ADMIN — MAGNESIUM SULFATE HEPTAHYDRATE 2 G/HR: 40 INJECTION, SOLUTION INTRAVENOUS at 23:03

## 2023-11-30 SDOH — HEALTH STABILITY: MENTAL HEALTH: CURRENT SMOKER: 0

## 2023-11-30 ASSESSMENT — PAIN SCALES - GENERAL
PAINLEVEL_OUTOF10: 0 - NO PAIN
PAINLEVEL_OUTOF10: 2
PAINLEVEL_OUTOF10: 0 - NO PAIN
PAINLEVEL_OUTOF10: 5 - MODERATE PAIN
PAINLEVEL_OUTOF10: 5 - MODERATE PAIN
PAINLEVEL_OUTOF10: 0 - NO PAIN

## 2023-11-30 ASSESSMENT — PAIN - FUNCTIONAL ASSESSMENT: PAIN_FUNCTIONAL_ASSESSMENT: 0-10

## 2023-11-30 NOTE — ANESTHESIA PREPROCEDURE EVALUATION
Patient: Ligia Carlin    Evaluation Method: Chart review    Procedure Information    Date: 11/30/23  Procedure: Labor Consult         Relevant Problems   Cardiovascular   (+) Severe pre-eclampsia in third trimester     Past Medical History:   Diagnosis Date    Acute tonsillitis, unspecified 05/28/2013    Acute tonsillitis    Hypertension      No past surgical history on file.  No Known Allergies      Clinical information reviewed:    Allergies  Meds               NPO Detail:  NPO/Void Status  Date of Last Solid: 11/29/23  Time of Last Solid: 1215         OB/GYN     Physical Exam    Airway  Mallampati: II  TM distance: >3 FB  Neck ROM: full     Cardiovascular    Dental - normal exam     Pulmonary    Abdominal            Anesthesia Plan    ASA 3     epidural     The patient is not a current smoker.    Anesthetic plan and risks discussed with patient.  Use of blood products discussed with patient who consented to blood products.    Plan discussed with CAA and attending.

## 2023-11-30 NOTE — H&P
Obstetrical Admission History and Physical     Ligia Carlin is a 28 y.o.  at 35w0d. VIDYA: 1/3/2024, by Patient Reported. Estimated fetal weight: 4.5lb. She has had prenatal care with [facility] .    Chief Complaint: Hypertension    Assessment/Plan       at 35.0 wk with cHTN in pregnancy, now elevating bp in severe ranges likely superimposed preeclampsia. On arrival multiple severe range bp requiring acute anti hypertensives. NST reactive    -Admit to L and D, and request for transfer to higher level of care  -Obtain IV access  -Vitals per protocol  -Admission labs, hypertensive panel, GBS sent  -Continuous monitoring  -IV magnesium sulfate started  -Pt receive 10 PO nifedipine, then once she had IV access required IV labetalol 20, 40, and then 80 mg. Then bp normalized to mild and normal ranges.  -IM betamethasone    Active Problems:  There are no active Hospital Problems.      Pregnancy Problems (from 25 to present)       No problems associated with this episode.          Admit for inpatient care.  Diagnosis:   Diagnosed based on: elevated blood pressures, severe range blood pressures greater than four hours apart, severe range blood pressures requiring immediate treatment, and symptoms  Blood pressure goal: <160/110  Short acting medications received? IR Nifedipine, dose given: 10mg and IV labetalol, dose given: 20,40, and then 80 mg    Long acting antihypertensive: 30 Nifedipine XL q am (last dose 23 at 6 am  HELLP labs: normal   Protein:Cr ratio: 0.19  Delivery planning: Hx of  x1, fetus cephalic on US today   corticosteroids: indicated  Magnesium for seizure prophylaxis: indicated  GBS prophylaxis: indicated    Subjective   Ligia is here complaining of high blood pressure.  Hypertension symptoms: Headache    Good fetal movement. Denies vaginal bleeding., Denies contractions., Denies leaking of fluid.      Pt went to scheduled office visit with NST today. She has Hx of  chronic HTN on Nifedipine XL. She has been trending home bp and noticed increased levels this week, and intermittent headaches. At the office she had several severe range bp, when her levels prior had been 130-150/80-90s and she typically had not been having headaches. She was send immediately from the office to L and D triage at [facility], where she continued to have severe range bp requiring acute antihypertensive medications as above.          Obstetrical History   OB History    Para Term  AB Living   2 1   1   1   SAB IAB Ectopic Multiple Live Births           1      # Outcome Date GA Lbr Guillermo/2nd Weight Sex Delivery Anes PTL Lv   2 Current            1                 Past Medical History  Past Medical History:   Diagnosis Date    Acute tonsillitis, unspecified 2013    Acute tonsillitis        Past Surgical History   No past surgical history on file.    Social History  Social History     Tobacco Use    Smoking status: Not on file    Smokeless tobacco: Not on file   Substance Use Topics    Alcohol use: Not on file     Substance and Sexual Activity   Drug Use Not on file       Allergies  Patient has no known allergies.     Medications  Medications Prior to Admission   Medication Sig Dispense Refill Last Dose    aspirin 81 mg EC tablet Take 1 tablet (81 mg) by mouth once daily.       NIFEdipine ER (Adalat CC) 30 mg 24 hr tablet Take 1 tablet (30 mg) by mouth once daily in the morning. Take before meals. Do not crush, chew, or split.          Objective    Last Vitals  Temp Pulse Resp BP MAP O2 Sat   37 °C (98.6 °F) 91 18 (!) 148/93   100 %     Physical Examination  GENERAL: Examination reveals a well developed, well nourished, gravid female in no acute distress. She is alert and cooperative.  HEENT: PERRLA. External ears normal. Nose normal, no erythema or discharge. Mouth and throat clear.  LUNGS: clear to auscultation bilaterally  HEART: regular rate and rhythm, S1, S2 normal, no murmur,  "click, rub or gallop  ABDOMEN: soft, gravid, nontender, nondistended, no abnormal masses, no epigastric pain  FHR is 140  , with Accelerations, and a Category I tracing.    [place] reading:  none  The fetus is in a vertex presentation, determined by ultrasound  CERVIX:  2 cm dilated,   60% effaced,  -2 station; MEMBRANES are  intact  EXTREMITIES: no redness or tenderness in the calves or thighs, edema 2+  SKIN: normal coloration and turgor, no rashes  NEUROLOGICAL: alert, oriented, normal speech, no focal findings or movement disorder noted, DTRs normal and symmetrical    Lab Review  Lab Results   Component Value Date    ABO O 11/29/2023    LABRH POS 11/29/2023    ABSCRN NEG 11/29/2023     Lab Results   Component Value Date    WBC 14.7 (H) 11/29/2023    HGB 10.5 (L) 11/29/2023    HCT 31.9 (L) 11/29/2023     11/29/2023     No results found for: \"GRPBSTREP\"  Lab Results   Component Value Date    GLUCOSE 85 11/29/2023     11/29/2023    K 4.1 11/29/2023     11/29/2023    CO2 21 (L) 11/29/2023    ANIONGAP 10 11/29/2023    BUN 12 11/29/2023    CREATININE 0.90 11/29/2023    EGFR >90 11/29/2023    CALCIUM 8.9 11/29/2023    ALBUMIN 3.3 (L) 11/29/2023    PROT 5.9 11/29/2023    ALKPHOS 101 11/29/2023    ALT 8 11/29/2023    AST 13 11/29/2023    BILITOT 0.3 11/29/2023     Lab Results   Component Value Date    UTPCR 0.16 11/29/2023       "

## 2023-11-30 NOTE — PROGRESS NOTES
Intrapartum Progress Note    Assessment/Plan     Ligia Carlin is a 28 y.o.  at 35w1d by OSH ultrasound transferred for IOL in setting of newly diagnosed siPEC w/SF.       IOL  - Pit at 16, continue per protocol  - AROM's clear at 0900am   - Delivery plan: patient desires vaginal delivery     siPEC w/ SF  - Diagnosed by severe range Bps requiring tx with IV meds  - PO nifed 10mg + IV labetalol 20/40/80 at [facility]  - Currently on nifed 30  - HELLP labs negative x2, p:c 0.12  - continue Mg @ 2g/hr  - s/p BMZ#1 @ , for second dose in 24 hours if remains pregnant     Maternal well-being  - s/p epidural     Fetal well-being  - CEFM; cat 1 currently     GBS: unknown  - Collected at OSH  - PCN ppx in setting of prematurity     Seen and d/w Dr. Baljinder Garcia MD PGY-2       Principal Problem:    Severe pre-eclampsia in third trimester    Pregnancy Problems (from 23 to present)       Problem Noted Resolved    Severe pre-eclampsia in third trimester 2023 by Althea Vogt MD No    Priority:  Medium              Subjective   Patient resting in bed, comfortable since epidural placement. Denies HA, visual changes, SOB, CP, or RUQ pain.    Objective   Last Vitals:  Temp Pulse Resp BP MAP Pulse Ox   37.1 °C (98.8 °F) 90 16 135/74   (!) 94 % (taking off sensor)     Vitals Min/Max Last 24 Hours:  Temp  Min: 36.6 °C (97.9 °F)  Max: 37.1 °C (98.8 °F)  Pulse  Min: 30  Max: 106  Resp  Min: 16  Max: 18  BP  Min: 120/59  Max: 172/106    Intake/Output:    Intake/Output Summary (Last 24 hours) at 2023 1006  Last data filed at 2023 0930  Gross per 24 hour   Intake 2098.4 ml   Output 1850 ml   Net 248.4 ml       Physical Examination:  General: no acute distress  HEENT: normocephalic, atraumatic  Heart: warm and well perfused  Lungs: breathing comfortably on room air  Abdomen: gravid  Extremities: moving all extremities  Neuro: awake and conversant  Psych: appropriate mood and  affect    Cervical Exam  Dilation: 4  Effacement (%): 70  Fetal Station: -3  Method: Manual  OB Examiner: Jose MORAN  Fetal Assessment  Movement: Present  Mode: External US  Baseline Fetal Heart Rate (bpm): 135 bpm  Baseline Classification: Normal  Variability: Moderate (Between 6 and 25 BPM)  Pattern: Accelerations  FHR Category: Category I  Multiple Births: No      Contraction Frequency: 3-4.5

## 2023-11-30 NOTE — H&P
Obstetrical Admission History and Physical    Assessment/Plan    Ligia Carlin is a 28 y.o.  at 35w1d by OSH ultrasound transferred for IOL in setting of newly diagnosed siPEC w/SF.      IOL  - Admit to L&D, consented and scanned- cephalic  - Cervix favorable - will start pitocin, AROM when appropriate  - Type and screen, CBC on admission  - Delivery plan: patient desires vaginal delivery    siPEC w/ SF  - Diagnosed by severe range Bps requiring tx with IV meds  - PO nifed 10mg + IV labetalol 20/40/80 at [facility], normotensive since arrival   - nifed 30 continued at OSH, continue at current dose given currently normotensive  - HELLP labs negative x1 at [facility], repeat set pending  - continue Mg @ 2g/hr  - s/p BMZ#1 @     Maternal well-being  - Epidural per patient request    Fetal well-being  - CEFM; cat 1 currently    GBS: unknown  - Collected at OSH  - PCN ppx in setting of prematurity    Seen and d/w Dr. Jess Desai, MS3    PGY-2 Addendum: I saw and evaluated the patient with the medical student. I have made any necessary modifications within the text.   Althea Vogt MD  PGY2, Obstetrics and Gynecology     Medical Problems       Problem List       * (Principal) Severe pre-eclampsia in third trimester          Subjective     27yo  at 35.1wga by OSH US, presenting for new siPEC w/ SF, now for IOL.     Pt presented to [facility] with multiple severe range BP at home. At [facility] she continued to have severe blood pressures (170/98). At [facility], she received 10mg PO nifedipine, then IV labetalol 20, 40, 80. Was started on Mag and given BMZ #1. She was asymptomatic at the time. Pt currently denies HA, changes in vision, RUQ pain or SOB.   Pt endorses fetal movement, denies ctx, VB, or LOF.     Pregnancy c/b  - cHTN - dx at 20 weeks, started on Nifed 30mg  - anemia - last Hgb 10.8, not on PO iron     OBHx:   - , 34.6wk ,  siPEC w/ SF   GynHx:   -  last pap 23  negative  PMH: denies  PSH: denies  Meds: nifedipine 30mg, asa 81mg  All: NKDA  Soc: denies tobacco, alcohol and drugs    Obstetrical History   OB History    Para Term  AB Living   2 1   1   1   SAB IAB Ectopic Multiple Live Births           1      # Outcome Date GA Lbr Guillermo/2nd Weight Sex Delivery Anes PTL Lv   2 Current            1                 Past Medical History  Past Medical History:   Diagnosis Date    Acute tonsillitis, unspecified 2013    Acute tonsillitis    Hypertension         Past Surgical History   No past surgical history on file.    Social History  Social History     Tobacco Use    Smoking status: Never     Passive exposure: Never    Smokeless tobacco: Never   Substance Use Topics    Alcohol use: Not Currently     Substance and Sexual Activity   Drug Use Never       Allergies  Patient has no known allergies.     Medications  Medications Prior to Admission   Medication Sig Dispense Refill Last Dose    aspirin 81 mg EC tablet Take 1 tablet (81 mg) by mouth once daily.       NIFEdipine ER (Adalat CC) 30 mg 24 hr tablet Take 1 tablet (30 mg) by mouth once daily in the morning. Take before meals. Do not crush, chew, or split.          Objective    Last Vitals  Temp Pulse Resp BP MAP O2 Sat   36.9 °C (98.4 °F) 89 16 135/76   96 %     Physical Examination  General: no acute distress  HEENT: normocephalic, atraumatic  Heart: warm and well perfused  Lungs: breathing comfortably on room air  Abdomen: gravid  Extremities: moving all extremities  Neuro: awake and conversant  Psych: appropriate mood and affect    Cervical Exam  Dilation: 3  Effacement (%): 80  Fetal Station: -2  Method: Manual  OB Examiner: Dr. Vogt  Fetal Assessment  Movement: Present  Mode: External US  Baseline Fetal Heart Rate (bpm): 130 bpm  Baseline Classification: Normal  Variability: Moderate (Between 6 and 25 BPM)  Pattern: Accelerations  Multiple Births: No     Lab Review  Lab Results   Component Value Date     WBC 14.7 (H) 11/29/2023    HGB 10.5 (L) 11/29/2023    HCT 31.9 (L) 11/29/2023    MCV 85 11/29/2023     11/29/2023

## 2023-11-30 NOTE — ANESTHESIA PROCEDURE NOTES
Epidural Block    Patient location during procedure: OB  Start time: 11/30/2023 6:31 AM  End time: 11/30/2023 6:43 AM  Reason for block: labor analgesia  Staffing  Performed: CHELI   Authorized by: CHELI Esquivel    Performed by: CHELI Esquivel    Preanesthetic Checklist  Completed: patient identified, IV checked, risks and benefits discussed, surgical consent, pre-op evaluation, timeout performed and sterile techniques followed  Block Timeout  RN/Licensed healthcare professional reads aloud to the Anesthesia provider and entire team: Patient identity, procedure with side and site, patient position, and as applicable the availability of implants/special equipment/special requirements.  Patient on coagulant treatment: no  Timeout performed at: 11/30/2023 6:33 AM  Block Placement  Patient position: sitting  Prep: ChloraPrep  Sterility prep: cap, drape, gloves and mask  Sedation level: no sedation  Patient monitoring: continuous pulse oximetry and blood pressure  Approach: midline  Local numbing: lidocaine 1% to skin and subcutaneous tissues  Vertebral space: lumbar  Lumbar location: L3-L4  Epidural  Loss of resistance technique: saline  Guidance: landmark technique        Needle  Needle type: Tuohy   Needle gauge: 17  Needle length: 10.2 cm  Needle insertion depth: 5 cm  Catheter type: multi-orifice  Catheter size: 19 G  Catheter at skin depth: 10 cm  Catheter securement method: clear occlusive dressing and liquid medical adhesive    Test dose: lidocaine 1.5% with epinephrine 1-to-200,000  Test dose given at 11/30/2023 6:42 AM  Test dose: lidocaine 1.5% with epinephrine 1-to-200,000  Test dose result: no positive test dose    PCEA  Medication concentration used: 0.044% Bupivacaine with 1.25 mcg/mL Fentanyl and [id number] Epinephrine  Dose (mL): 10  Lockout (minutes): 15  1-Hour Limit (boluses/hr): 4  Basal Rate: 14        Assessment  Sensory level: T10 bilateral  Block outcome: patient  comfortable  Number of attempts: 1  Events: no positive test dose  Procedure assessment: patient tolerated procedure well with no immediate complications

## 2023-11-30 NOTE — PROGRESS NOTES
Postpartum Progress Note    Assessment/Plan   Ligia Carlin is a 28 y.o., , who delivered at 35w1d gestation and is now postpartum day 0 from  .    s/p  on   - continue routine postpartum care  - pain well controlled on po medications  - dvt risk score  5 , for ppx lovenox    siPEC w/ SF   - diagnosed by severe range Bps requiring IV tx   - last IV treatment at OSH   - BP regimen: nifedipine 30, currently normotensive to mild range  - currently asymptomatic   - HELLP labs neg x2  - UOP adequate  - continue Mg 2g/hr, no si/sx of toxicity     Breastfeeding  - breastfeeding  - lactation consult PRN     Contraception  - declines     Dispo  - for BP check in 1 wk after discharge, PPV in 6wks    D/w Dr. Oscar Vogt MD  PGY2, Obstetrics and Gynecology     Principal Problem:    Severe pre-eclampsia in third trimester    Pregnancy Problems (from 23 to present)       Problem Noted Resolved    Severe pre-eclampsia in third trimester 2023 by Althea Vogt MD No    Priority:  Medium              Subjective   Pt feeling well. Pain well controlled, tolerating small PO, just ordered dinner. Denies HA, VA changes, CP, SOB, RUQ pain     Objective   Allergies:   Patient has no known allergies.         Last Vitals:  Temp Pulse Resp BP MAP Pulse Ox   36.9 °C (98.4 °F) 81 16 133/66   100 %     Vitals Min/Max Last 24 Hours:  Temp  Min: 36.6 °C (97.9 °F)  Max: 37.2 °C (99 °F)  Pulse  Min: 30  Max: 115  Resp  Min: 16  Max: 18  BP  Min: 112/53  Max: 163/88    Intake/Output:     Intake/Output Summary (Last 24 hours) at 2023 1858  Last data filed at 2023 1830  Gross per 24 hour   Intake 4144.74 ml   Output 3881 ml   Net 263.74 ml       Physical Exam:  General: no acute distress  HEENT: normocephalic, atraumatic  Heart: warm and well perfused  Lungs: no increased WOB, CTAB  Abdomen: gravid  Extremities: moving all extremities  Neuro: awake and conversant, DTRs 2+   Psych: appropriate mood and  "affect     Lab Data:  Lab Results   Component Value Date    WBC 15.9 (H) 11/30/2023    HGB 12.3 11/30/2023    HCT 39.6 11/30/2023     11/30/2023     Lab Results   Component Value Date    GLUCOSE 131 (H) 11/30/2023     11/30/2023    K 4.1 11/30/2023     11/30/2023    CO2 19 (L) 11/30/2023    ANIONGAP 18 11/30/2023    BUN 11 11/30/2023    CREATININE 0.90 11/30/2023    EGFR >90 11/30/2023    CALCIUM 8.0 (L) 11/30/2023    ALBUMIN 3.0 (L) 11/30/2023    PROT 5.5 (L) 11/30/2023    ALKPHOS 99 11/30/2023    ALT 8 11/30/2023    AST 13 11/30/2023    BILITOT 0.4 11/30/2023     No results found for: \"UTPCR\"  "

## 2023-11-30 NOTE — ANESTHESIA PREPROCEDURE EVALUATION
Patient: Ligia Carlin    Evaluation Method: Chart review    Procedure Information    Date: 11/30/23  Procedure: Labor Consult         Relevant Problems   No relevant active problems     Past Medical History:   Diagnosis Date    Acute tonsillitis, unspecified 05/28/2013    Acute tonsillitis    Hypertension      No past surgical history on file.  No Known Allergies      Clinical information reviewed:    Allergies  Meds               NPO Detail:  NPO/Void Status  Date of Last Solid: 11/29/23  Time of Last Solid: 1215         OB/GYN     PHYSICAL EXAM    Anesthesia Plan

## 2023-12-01 PROBLEM — R39.9 UTI SYMPTOMS: Status: ACTIVE | Noted: 2023-12-01

## 2023-12-01 PROBLEM — N30.00 CYSTITIS, ACUTE: Status: ACTIVE | Noted: 2023-12-01

## 2023-12-01 PROBLEM — O16.3 HYPERTENSION AFFECTING PREGNANCY IN THIRD TRIMESTER (HHS-HCC): Status: ACTIVE | Noted: 2023-12-01

## 2023-12-01 PROCEDURE — 2500000004 HC RX 250 GENERAL PHARMACY W/ HCPCS (ALT 636 FOR OP/ED)

## 2023-12-01 PROCEDURE — 99232 SBSQ HOSP IP/OBS MODERATE 35: CPT | Performed by: STUDENT IN AN ORGANIZED HEALTH CARE EDUCATION/TRAINING PROGRAM

## 2023-12-01 PROCEDURE — 2500000001 HC RX 250 WO HCPCS SELF ADMINISTERED DRUGS (ALT 637 FOR MEDICARE OP)

## 2023-12-01 PROCEDURE — 2500000004 HC RX 250 GENERAL PHARMACY W/ HCPCS (ALT 636 FOR OP/ED): Performed by: NURSE PRACTITIONER

## 2023-12-01 PROCEDURE — 2500000001 HC RX 250 WO HCPCS SELF ADMINISTERED DRUGS (ALT 637 FOR MEDICARE OP): Performed by: STUDENT IN AN ORGANIZED HEALTH CARE EDUCATION/TRAINING PROGRAM

## 2023-12-01 PROCEDURE — 96372 THER/PROPH/DIAG INJ SC/IM: CPT

## 2023-12-01 PROCEDURE — 1210000001 HC SEMI-PRIVATE ROOM DAILY

## 2023-12-01 RX ORDER — OXYTOCIN/0.9 % SODIUM CHLORIDE 30/500 ML
60 PLASTIC BAG, INJECTION (ML) INTRAVENOUS ONCE AS NEEDED
Status: DISCONTINUED | OUTPATIENT
Start: 2023-12-01 | End: 2023-12-04 | Stop reason: HOSPADM

## 2023-12-01 RX ORDER — CARBOPROST TROMETHAMINE 250 UG/ML
250 INJECTION, SOLUTION INTRAMUSCULAR ONCE AS NEEDED
Status: DISCONTINUED | OUTPATIENT
Start: 2023-12-01 | End: 2023-12-04 | Stop reason: HOSPADM

## 2023-12-01 RX ORDER — POLYETHYLENE GLYCOL 3350 17 G/17G
17 POWDER, FOR SOLUTION ORAL 2 TIMES DAILY PRN
Status: DISCONTINUED | OUTPATIENT
Start: 2023-12-01 | End: 2023-12-04 | Stop reason: HOSPADM

## 2023-12-01 RX ORDER — NIFEDIPINE 60 MG/1
60 TABLET, FILM COATED, EXTENDED RELEASE ORAL EVERY 24 HOURS
Status: DISCONTINUED | OUTPATIENT
Start: 2023-12-02 | End: 2023-12-01

## 2023-12-01 RX ORDER — OXYTOCIN 10 [USP'U]/ML
10 INJECTION, SOLUTION INTRAMUSCULAR; INTRAVENOUS ONCE AS NEEDED
Status: DISCONTINUED | OUTPATIENT
Start: 2023-12-01 | End: 2023-12-04 | Stop reason: HOSPADM

## 2023-12-01 RX ORDER — DIPHENHYDRAMINE HYDROCHLORIDE 50 MG/ML
25 INJECTION INTRAMUSCULAR; INTRAVENOUS EVERY 6 HOURS PRN
Status: DISCONTINUED | OUTPATIENT
Start: 2023-12-01 | End: 2023-12-04 | Stop reason: HOSPADM

## 2023-12-01 RX ORDER — NIFEDIPINE 60 MG/1
60 TABLET, FILM COATED, EXTENDED RELEASE ORAL
Status: DISCONTINUED | OUTPATIENT
Start: 2023-12-02 | End: 2023-12-03

## 2023-12-01 RX ORDER — ACETAMINOPHEN 325 MG/1
975 TABLET ORAL EVERY 6 HOURS
Status: DISCONTINUED | OUTPATIENT
Start: 2023-12-01 | End: 2023-12-04 | Stop reason: HOSPADM

## 2023-12-01 RX ORDER — DIPHENHYDRAMINE HCL 25 MG
25 CAPSULE ORAL EVERY 6 HOURS PRN
Status: DISCONTINUED | OUTPATIENT
Start: 2023-12-01 | End: 2023-12-04 | Stop reason: HOSPADM

## 2023-12-01 RX ORDER — HYDRALAZINE HYDROCHLORIDE 20 MG/ML
5 INJECTION INTRAMUSCULAR; INTRAVENOUS ONCE AS NEEDED
Status: DISCONTINUED | OUTPATIENT
Start: 2023-12-01 | End: 2023-12-04 | Stop reason: HOSPADM

## 2023-12-01 RX ORDER — IBUPROFEN 600 MG/1
600 TABLET ORAL EVERY 6 HOURS
Status: DISCONTINUED | OUTPATIENT
Start: 2023-12-01 | End: 2023-12-04 | Stop reason: HOSPADM

## 2023-12-01 RX ORDER — MISOPROSTOL 200 UG/1
800 TABLET ORAL ONCE AS NEEDED
Status: DISCONTINUED | OUTPATIENT
Start: 2023-12-01 | End: 2023-12-04 | Stop reason: HOSPADM

## 2023-12-01 RX ORDER — LIDOCAINE 560 MG/1
1 PATCH PERCUTANEOUS; TOPICAL; TRANSDERMAL
Status: DISCONTINUED | OUTPATIENT
Start: 2023-12-01 | End: 2023-12-04 | Stop reason: HOSPADM

## 2023-12-01 RX ORDER — NIFEDIPINE 10 MG/1
10 CAPSULE ORAL ONCE AS NEEDED
Status: DISCONTINUED | OUTPATIENT
Start: 2023-12-01 | End: 2023-12-04 | Stop reason: HOSPADM

## 2023-12-01 RX ORDER — LOPERAMIDE HYDROCHLORIDE 2 MG/1
4 CAPSULE ORAL EVERY 2 HOUR PRN
Status: DISCONTINUED | OUTPATIENT
Start: 2023-12-01 | End: 2023-12-04 | Stop reason: HOSPADM

## 2023-12-01 RX ORDER — BISACODYL 10 MG/1
10 SUPPOSITORY RECTAL DAILY PRN
Status: DISCONTINUED | OUTPATIENT
Start: 2023-12-01 | End: 2023-12-04 | Stop reason: HOSPADM

## 2023-12-01 RX ORDER — ADHESIVE BANDAGE
10 BANDAGE TOPICAL
Status: DISCONTINUED | OUTPATIENT
Start: 2023-12-01 | End: 2023-12-04 | Stop reason: HOSPADM

## 2023-12-01 RX ORDER — SIMETHICONE 80 MG
80 TABLET,CHEWABLE ORAL 4 TIMES DAILY PRN
Status: DISCONTINUED | OUTPATIENT
Start: 2023-12-01 | End: 2023-12-04 | Stop reason: HOSPADM

## 2023-12-01 RX ORDER — NIFEDIPINE 30 MG/1
30 TABLET, FILM COATED, EXTENDED RELEASE ORAL ONCE
Status: COMPLETED | OUTPATIENT
Start: 2023-12-01 | End: 2023-12-01

## 2023-12-01 RX ORDER — TRANEXAMIC ACID 100 MG/ML
1000 INJECTION, SOLUTION INTRAVENOUS ONCE AS NEEDED
Status: DISCONTINUED | OUTPATIENT
Start: 2023-12-01 | End: 2023-12-04 | Stop reason: HOSPADM

## 2023-12-01 RX ORDER — METHYLERGONOVINE MALEATE 0.2 MG/ML
0.2 INJECTION INTRAVENOUS ONCE AS NEEDED
Status: DISCONTINUED | OUTPATIENT
Start: 2023-12-01 | End: 2023-12-04 | Stop reason: HOSPADM

## 2023-12-01 RX ORDER — ENOXAPARIN SODIUM 100 MG/ML
40 INJECTION SUBCUTANEOUS EVERY 24 HOURS
Status: DISCONTINUED | OUTPATIENT
Start: 2023-12-02 | End: 2023-12-04 | Stop reason: HOSPADM

## 2023-12-01 RX ORDER — LABETALOL HYDROCHLORIDE 5 MG/ML
20 INJECTION, SOLUTION INTRAVENOUS ONCE AS NEEDED
Status: DISCONTINUED | OUTPATIENT
Start: 2023-12-01 | End: 2023-12-04 | Stop reason: HOSPADM

## 2023-12-01 RX ORDER — ONDANSETRON HYDROCHLORIDE 2 MG/ML
4 INJECTION, SOLUTION INTRAVENOUS EVERY 6 HOURS PRN
Status: DISCONTINUED | OUTPATIENT
Start: 2023-12-01 | End: 2023-12-04 | Stop reason: HOSPADM

## 2023-12-01 RX ORDER — ONDANSETRON 4 MG/1
4 TABLET, FILM COATED ORAL EVERY 6 HOURS PRN
Status: DISCONTINUED | OUTPATIENT
Start: 2023-12-01 | End: 2023-12-04 | Stop reason: HOSPADM

## 2023-12-01 RX ADMIN — ACETAMINOPHEN 975 MG: 325 TABLET ORAL at 09:31

## 2023-12-01 RX ADMIN — ACETAMINOPHEN 975 MG: 325 TABLET ORAL at 16:25

## 2023-12-01 RX ADMIN — IBUPROFEN 600 MG: 600 TABLET, FILM COATED ORAL at 22:21

## 2023-12-01 RX ADMIN — ACETAMINOPHEN 975 MG: 325 TABLET ORAL at 22:21

## 2023-12-01 RX ADMIN — ENOXAPARIN SODIUM 40 MG: 40 INJECTION SUBCUTANEOUS at 03:30

## 2023-12-01 RX ADMIN — SODIUM CHLORIDE, POTASSIUM CHLORIDE, SODIUM LACTATE AND CALCIUM CHLORIDE 75 ML/HR: 600; 310; 30; 20 INJECTION, SOLUTION INTRAVENOUS at 03:25

## 2023-12-01 RX ADMIN — IBUPROFEN 600 MG: 600 TABLET, FILM COATED ORAL at 16:26

## 2023-12-01 RX ADMIN — IBUPROFEN 600 MG: 600 TABLET, FILM COATED ORAL at 09:32

## 2023-12-01 RX ADMIN — NIFEDIPINE 30 MG: 30 TABLET, FILM COATED, EXTENDED RELEASE ORAL at 09:00

## 2023-12-01 RX ADMIN — IBUPROFEN 600 MG: 600 TABLET, FILM COATED ORAL at 03:31

## 2023-12-01 RX ADMIN — MAGNESIUM SULFATE HEPTAHYDRATE 2 G/HR: 40 INJECTION, SOLUTION INTRAVENOUS at 08:38

## 2023-12-01 RX ADMIN — ACETAMINOPHEN 975 MG: 325 TABLET ORAL at 03:31

## 2023-12-01 RX ADMIN — NIFEDIPINE 30 MG: 30 TABLET, FILM COATED, EXTENDED RELEASE ORAL at 15:46

## 2023-12-01 ASSESSMENT — PAIN - FUNCTIONAL ASSESSMENT
PAIN_FUNCTIONAL_ASSESSMENT: 0-10

## 2023-12-01 ASSESSMENT — PAIN SCALES - GENERAL
PAINLEVEL_OUTOF10: 4
PAINLEVEL_OUTOF10: 0 - NO PAIN
PAINLEVEL_OUTOF10: 1
PAIN_LEVEL: 0
PAINLEVEL_OUTOF10: 0 - NO PAIN
PAINLEVEL_OUTOF10: 0 - NO PAIN

## 2023-12-01 NOTE — DISCHARGE INSTR - AVS FIRST PAGE
Any woman can have complications after a birth including a blood clot, a heart problem, hypertensive disorder/eclampsia, depression, hemorrhage, or infection. Notify all providers of your delivery date up to one year after birth.*       Call 911 or go to nearest emergency room right away if you have:   PAIN or pressure in chest;   OBSTRUCTED breathing or shortness of breath;   SEIZURES;   THOUGHTS of hurting yourself or someone else; heart palpitations/racing; change in alertness/confusion.    Call your provider if you have:   BLEEDING, soaking through a pad/hour, or blood clots the size of an egg or bigger;   INCISION (episiotomy stitches or  site) that is not healing (increased redness, pain, drainage/pus, or separation) if you had one;   RED or swollen leg/calf that is painful or warm to touch, especially in one leg more than the other;   TEMPERATURE of 100.4 F or higher or chills;   HEADACHE that does not get better with medicine, rest or hydration, or bad headache with vision changes   like spots or flashing lights; increased swelling of face, hands or legs; severe cramps or upper right belly pain; red or swollen breast that is painful or warm to touch; an unusual, foul odor from your vaginal discharge; pain, burning, or difficulty during urination; severe constipation (more than 5 days); feelings of depression (such as depressed mood, loss of interest in enjoyable things, unable to care for yourself, trouble sleeping, lack of appetite, or feeling worthless).     If you can't reach your provider or symptoms worsen, call 911 or go to nearest emergency room.   *Information obtained from [facility]'s: Save Your Life: Get Care for These POST-BIRTH Warning Signs

## 2023-12-01 NOTE — DISCHARGE INSTRUCTIONS
A few days after your discharge, one of our care coordinators may be calling you to see how things have been going at home. This phone call also gives you the opportunity to clarify any information on your discharge instructions or ask any questions that may have come up since leaving the hospital.

## 2023-12-01 NOTE — SIGNIFICANT EVENT
Patient meets criteria for home monitoring of blood pressure post discharge.  Met with patient to assess for availability of home BP monitor.  Patient stated she owns home BP monitor. Patient educated on importance of continuing to monitor BP at home, recording BP on home monitoring log and s/sx of when to call her provider.  Pt verbalized understanding the above information.

## 2023-12-01 NOTE — PROGRESS NOTES
Postpartum Progress Note    Assessment/Plan   Ligia Carlin is a 28 y.o., , who delivered at 35w1d gestation and is now postpartum day 1 from [facility] .    s/p  on   - continue routine postpartum care  - pain well controlled on po medications  - dvt risk score  5 , for ppx lovenox    siPEC w/ SF   - diagnosed by severe range Bps requiring IV tx   - last IV treatment at OSH   - BP regimen: nifedipine 30, currently normotensive to mild range  - currently asymptomatic   - HELLP labs neg x2  - UOP adequate  - continue Mg 2g/hr, no si/sx of toxicity     Breastfeeding  - breastfeeding  - lactation consult PRN     Contraception  - declines     Dispo  - for BP check in 1 wk after discharge, PPV in 6wks    Yecenia Christianson MD     Principal Problem:    Severe pre-eclampsia in third trimester    Pregnancy Problems (from 23 to present)       Problem Noted Resolved    Severe pre-eclampsia in third trimester 2023 by Althea Vogt MD No    Priority:  Medium              Subjective   Pt feeling well. Pain well controlled, tolerating small PO, just ordered dinner. Denies HA, VA changes, CP, SOB, RUQ pain     Objective   Allergies:   Patient has no known allergies.         Last Vitals:  Temp Pulse Resp BP MAP Pulse Ox   36.5 °C (97.7 °F) 80 14 (!) 141/93   100 %     Vitals Min/Max Last 24 Hours:  Temp  Min: 36.1 °C (97 °F)  Max: 37.2 °C (99 °F)  Pulse  Min: 30  Max: 115  Resp  Min: 12  Max: 18  BP  Min: 112/53  Max: 167/77    Intake/Output:     Intake/Output Summary (Last 24 hours) at 2023 0914  Last data filed at 2023 0830  Gross per 24 hour   Intake 5324.06 ml   Output 7196 ml   Net -1871.94 ml       Physical Exam:  General: no acute distress  HEENT: normocephalic, atraumatic  Heart: warm and well perfused  Lungs: no increased WOB, CTAB  Abdomen: gravid  Extremities: moving all extremities  Neuro: awake and conversant, DTRs 2+   Psych: appropriate mood and affect     Lab Data:  Lab Results  "  Component Value Date    WBC 15.9 (H) 11/30/2023    HGB 12.3 11/30/2023    HCT 39.6 11/30/2023     11/30/2023     Lab Results   Component Value Date    GLUCOSE 131 (H) 11/30/2023     11/30/2023    K 4.1 11/30/2023     11/30/2023    CO2 19 (L) 11/30/2023    ANIONGAP 18 11/30/2023    BUN 11 11/30/2023    CREATININE 0.90 11/30/2023    EGFR >90 11/30/2023    CALCIUM 8.0 (L) 11/30/2023    ALBUMIN 3.0 (L) 11/30/2023    PROT 5.5 (L) 11/30/2023    ALKPHOS 99 11/30/2023    ALT 8 11/30/2023    AST 13 11/30/2023    BILITOT 0.4 11/30/2023     No results found for: \"UTPCR\"  "

## 2023-12-01 NOTE — CARE PLAN
The patient's goals for the shift include BP stable and see baby    The clinical goals for the shift include BP will remain below 160/110 this shift    Patient admitted to Mac 4 this shift. Nps made aware of elevated BP. Patient has been able to void x2 and has been visiting infant in the NICU.

## 2023-12-01 NOTE — LACTATION NOTE
This note was copied from a baby's chart.  Lactation Consultant Note  Recommendations/Summary       I spoke with mom at pt's bedside to explained availability of the [facility] LC services.  Instructed on listed patient education: DESMOND, Benefits of mother's own milk for the infant, breast massage and hand expression,CDC pump cleaning & sanitizing guidelines.  Mom has breast fed her prior child.  That child she pumped for until he was 5 months old and then she was able to get him to latch. She understands the process of pumping.  She  would like assistance with latching.  Mom has a pump for home use.  She was invited to follow up with LC services as needed.

## 2023-12-01 NOTE — ANESTHESIA POSTPROCEDURE EVALUATION
Patient: Ligia Carlin    Procedure Summary       Date: 11/30/23 Room / Location:     Anesthesia Start: 0631 Anesthesia Stop: 1323    Procedure: Labor Analgesia Diagnosis:     Scheduled Providers:  Responsible Provider: Julia Evans MD    Anesthesia Type: epidural ASA Status: 3            Anesthesia Type: epidural    Vitals Value Taken Time   /85 12/01/23 0828   Temp 36.5 12/01/23 0828   Pulse 82 12/01/23 0825   Resp 12 12/01/23 0828   SpO2 92 % 12/01/23 0825       Anesthesia Post Evaluation    Patient location during evaluation: bedside  Patient participation: complete - patient participated  Level of consciousness: awake  Pain score: 0  Pain management: adequate  Airway patency: patent  Cardiovascular status: acceptable  Respiratory status: acceptable  Hydration status: acceptable  Postoperative Nausea and Vomiting: none  Comments: Patient on PP Mag infusion        No notable events documented.

## 2023-12-02 LAB — GP B STREP GENITAL QL CULT: ABNORMAL

## 2023-12-02 PROCEDURE — 1210000001 HC SEMI-PRIVATE ROOM DAILY

## 2023-12-02 PROCEDURE — 96372 THER/PROPH/DIAG INJ SC/IM: CPT | Performed by: STUDENT IN AN ORGANIZED HEALTH CARE EDUCATION/TRAINING PROGRAM

## 2023-12-02 PROCEDURE — 2500000001 HC RX 250 WO HCPCS SELF ADMINISTERED DRUGS (ALT 637 FOR MEDICARE OP): Performed by: STUDENT IN AN ORGANIZED HEALTH CARE EDUCATION/TRAINING PROGRAM

## 2023-12-02 PROCEDURE — 2500000004 HC RX 250 GENERAL PHARMACY W/ HCPCS (ALT 636 FOR OP/ED): Performed by: STUDENT IN AN ORGANIZED HEALTH CARE EDUCATION/TRAINING PROGRAM

## 2023-12-02 RX ADMIN — ACETAMINOPHEN 975 MG: 325 TABLET ORAL at 10:36

## 2023-12-02 RX ADMIN — IBUPROFEN 600 MG: 600 TABLET, FILM COATED ORAL at 04:14

## 2023-12-02 RX ADMIN — IBUPROFEN 600 MG: 600 TABLET, FILM COATED ORAL at 10:36

## 2023-12-02 RX ADMIN — POLYETHYLENE GLYCOL 3350 17 G: 17 POWDER, FOR SOLUTION ORAL at 21:24

## 2023-12-02 RX ADMIN — ACETAMINOPHEN 975 MG: 325 TABLET ORAL at 16:45

## 2023-12-02 RX ADMIN — ENOXAPARIN SODIUM 40 MG: 100 INJECTION SUBCUTANEOUS at 04:14

## 2023-12-02 RX ADMIN — NIFEDIPINE 60 MG: 60 TABLET, FILM COATED, EXTENDED RELEASE ORAL at 07:02

## 2023-12-02 RX ADMIN — ACETAMINOPHEN 975 MG: 325 TABLET ORAL at 04:14

## 2023-12-02 RX ADMIN — IBUPROFEN 600 MG: 600 TABLET, FILM COATED ORAL at 16:45

## 2023-12-02 ASSESSMENT — PAIN SCALES - GENERAL
PAINLEVEL_OUTOF10: 0 - NO PAIN
PAINLEVEL_OUTOF10: 1

## 2023-12-02 NOTE — PROGRESS NOTES
Postpartum Progress Note    Assessment/Plan   Ligia Carlin is a 28 y.o., , who delivered at 35w1d gestation and is now postpartum day 2 from  .    s/p  on   - continue routine postpartum care  - pain well controlled on po medications  - dvt risk score  5 , for ppx lovenox    siPEC w/ SF   - diagnosed by severe range Bps requiring IV tx   - last IV treatment at OSH   - BP regimen: nifedipine 60, BP WNL  - currently asymptomatic   - HELLP labs neg x2  - s/p Mg    Breastfeeding  - breastfeeding  - lactation consult PRN     Contraception  - partner vasectomy, declines bridge  - We discussed the ability to become pregnant in the absence of regular menses. Pt verbalized understanding.     Dispo  - for BP check in 1 wk after discharge, PPV in 6wks    DILMA Nice-CNP     Principal Problem:    Severe pre-eclampsia in third trimester    Pregnancy Problems (from 23 to present)       Problem Noted Resolved    Severe pre-eclampsia in third trimester 2023 by Althea Vogt MD No    Priority:  Medium              Subjective   Pt feeling well. Pain well controlled, tolerating small PO, just ordered dinner. Denies HA, VA changes, CP, SOB, RUQ pain     Objective   Allergies:   Patient has no known allergies.         Last Vitals:  Temp Pulse Resp BP MAP Pulse Ox   36.8 °C (98.2 °F) 75 18 131/84   97 %     Vitals Min/Max Last 24 Hours:  Temp  Min: 36.5 °C (97.7 °F)  Max: 37.1 °C (98.8 °F)  Pulse  Min: 64  Max: 81  Resp  Min: 18  Max: 18  BP  Min: 109/71  Max: 142/94    Intake/Output:   No intake or output data in the 24 hours ending 23 2694      Physical Exam:  General: no acute distress  HEENT: normocephalic, atraumatic  Heart: warm and well perfused  Lungs: no increased WOB, CTAB  Abdomen: fundus firm, below umbilicus  Extremities: moving all extremities  Neuro: awake and conversant, DTRs 2+   Psych: appropriate mood and affect     Lab Data:  Lab Results   Component Value Date    WBC  "15.9 (H) 11/30/2023    HGB 12.3 11/30/2023    HCT 39.6 11/30/2023     11/30/2023     Lab Results   Component Value Date    GLUCOSE 131 (H) 11/30/2023     11/30/2023    K 4.1 11/30/2023     11/30/2023    CO2 19 (L) 11/30/2023    ANIONGAP 18 11/30/2023    BUN 11 11/30/2023    CREATININE 0.90 11/30/2023    EGFR >90 11/30/2023    CALCIUM 8.0 (L) 11/30/2023    ALBUMIN 3.0 (L) 11/30/2023    PROT 5.5 (L) 11/30/2023    ALKPHOS 99 11/30/2023    ALT 8 11/30/2023    AST 13 11/30/2023    BILITOT 0.4 11/30/2023     No results found for: \"UTPCR\"  "

## 2023-12-02 NOTE — L&D DELIVERY NOTE
OB Delivery Note  2023  Ligia Carlin  28 y.o.   Vaginal, Spontaneous      Gestational Age: 35w1d  /Para:   Quantitative Blood Loss: Admission to Discharge: 441 mL (2023 10:21 PM - 2023 12:23 AM)    Kishor Carlin [[id number]]      Labor Events    Sac identifier: Sac 1  Rupture date/time: 2023 0853  Rupture type: Artificial  Fluid color: Clear  Fluid odor: None  Labor type: Induced Onset of Labor  Labor allowed to proceed with plans for an attempted vaginal birth?: Yes  Induction: AROM, Oxytocin  Induction indications: Hypertensive Disorder of Pregnancy  Complications: None       Labor Event Times    Labor onset date/time: 2023 0200  Dilation complete date/time: 2023 1311  Start pushing date/time: 2023 1321       Labor Length    1st stage: 35h 11m  2nd stage: 0h 12m  3rd stage: 0h 05m       Placenta    Placenta delivery date/time: 2023 1328  Placenta removal: Spontaneous  Placenta appearance: Intact  Placenta disposition: lab       Cord    Vessels: 3 vessels  Complications: Nuchal  Nuchal intervention: reduced  Delayed cord clamping?: Yes  Cord clamped date/time: 2023 1324  Gases sent?: No  Cord comments: cord blood sent with NICU team  Stem cell collection (by provider): No       Lacerations    Episiotomy: None  Perineal laceration: None  Other lacerations?: No  Repair suture: None       Anesthesia    Method: Epidural       Operative Delivery    Forceps attempted?: No  Vacuum extractor attempted?: No       Shoulder Dystocia    Shoulder dystocia present?: No       [place] Delivery    Time head delivered: 2023 13:22:00  Birth date/time: 2023 13:23:00  Delivery type: Vaginal, Spontaneous  Complications: None       Resuscitation    Method: Suctioning, Supplemental oxygen, Continuous positive airway pressure (CPAP), Tactile stimulation       Apgars    Living status: Living  Apgar Component Scores:  1 min.:  5 min.:  10 min.:  15 min.:  20  min.:    Skin color:  0  1       Heart rate:  2  2       Reflex irritability:  2  2       Muscle tone:  1  1       Respiratory effort:  1  2       Total:  6  8       Apgars assigned by: LORI       Delivery Providers    Delivering clinician: Jermaine Gale MD   Provider Role    Aparna Garcia, RN Delivery Nurse    Ericka Gallardo, RN Nursery Nurse    Concha Shore MD Resident    Alisson Bassett RN Delivery Nurse                 Concha Shore MD

## 2023-12-02 NOTE — CARE PLAN
The patient's goals for the shift include BP will remain stable    The clinical goals for the shift include Bp <160/90    Patients BP was stable this shift.   Problem: Discharge Planning  Goal: Discharge to home or other facility with appropriate resources  Outcome: Progressing     Problem: Postpartum  Goal: Experiences normal postpartum course  Outcome: Progressing  Goal: Appropriate maternal -  bonding  Outcome: Progressing  Goal: Establish and maintain infant feeding pattern for adequate nutrition  Outcome: Progressing  Goal: Incisions, wounds, or drain sites healing without S/S of infection  Outcome: Progressing  Goal: No s/sx infection  Outcome: Progressing  Goal: No s/sx of hemorrhage  Outcome: Progressing  Goal: Minimal s/sx of HDP and BP<160/110  Outcome: Progressing

## 2023-12-03 ENCOUNTER — PHARMACY VISIT (OUTPATIENT)
Dept: PHARMACY | Facility: CLINIC | Age: 28
End: 2023-12-03
Payer: COMMERCIAL

## 2023-12-03 PROCEDURE — 2500000004 HC RX 250 GENERAL PHARMACY W/ HCPCS (ALT 636 FOR OP/ED): Performed by: STUDENT IN AN ORGANIZED HEALTH CARE EDUCATION/TRAINING PROGRAM

## 2023-12-03 PROCEDURE — 2500000001 HC RX 250 WO HCPCS SELF ADMINISTERED DRUGS (ALT 637 FOR MEDICARE OP): Performed by: STUDENT IN AN ORGANIZED HEALTH CARE EDUCATION/TRAINING PROGRAM

## 2023-12-03 PROCEDURE — 2500000004 HC RX 250 GENERAL PHARMACY W/ HCPCS (ALT 636 FOR OP/ED)

## 2023-12-03 PROCEDURE — RXMED WILLOW AMBULATORY MEDICATION CHARGE

## 2023-12-03 PROCEDURE — 96372 THER/PROPH/DIAG INJ SC/IM: CPT | Performed by: STUDENT IN AN ORGANIZED HEALTH CARE EDUCATION/TRAINING PROGRAM

## 2023-12-03 PROCEDURE — 1210000001 HC SEMI-PRIVATE ROOM DAILY

## 2023-12-03 RX ORDER — IBUPROFEN 600 MG/1
600 TABLET ORAL EVERY 6 HOURS PRN
Qty: 60 TABLET | Refills: 0 | Status: SHIPPED | OUTPATIENT
Start: 2023-12-03

## 2023-12-03 RX ORDER — NIFEDIPINE 30 MG/1
30 TABLET, FILM COATED, EXTENDED RELEASE ORAL ONCE
Status: COMPLETED | OUTPATIENT
Start: 2023-12-03 | End: 2023-12-03

## 2023-12-03 RX ORDER — NORETHINDRONE 0.35 MG/1
1 TABLET ORAL DAILY
Qty: 84 TABLET | Refills: 3 | Status: SHIPPED | OUTPATIENT
Start: 2023-12-03

## 2023-12-03 RX ORDER — NIFEDIPINE 60 MG/1
60 TABLET, FILM COATED, EXTENDED RELEASE ORAL EVERY 12 HOURS
Status: DISCONTINUED | OUTPATIENT
Start: 2023-12-04 | End: 2023-12-04 | Stop reason: HOSPADM

## 2023-12-03 RX ORDER — ACETAMINOPHEN 325 MG/1
975 TABLET ORAL EVERY 6 HOURS PRN
Qty: 120 TABLET | Refills: 0 | Status: SHIPPED | OUTPATIENT
Start: 2023-12-03

## 2023-12-03 RX ORDER — NIFEDIPINE 60 MG/1
60 TABLET, FILM COATED, EXTENDED RELEASE ORAL EVERY 12 HOURS
Qty: 84 TABLET | Refills: 0 | Status: SHIPPED | OUTPATIENT
Start: 2023-12-04

## 2023-12-03 RX ORDER — NIFEDIPINE 90 MG/1
90 TABLET, EXTENDED RELEASE ORAL
Status: DISCONTINUED | OUTPATIENT
Start: 2023-12-04 | End: 2023-12-03

## 2023-12-03 RX ORDER — POLYETHYLENE GLYCOL 3350 17 G/17G
17 POWDER, FOR SOLUTION ORAL 2 TIMES DAILY PRN
Qty: 238 G | Refills: 0 | Status: SHIPPED | OUTPATIENT
Start: 2023-12-03

## 2023-12-03 RX ADMIN — NIFEDIPINE 30 MG: 30 TABLET, FILM COATED, EXTENDED RELEASE ORAL at 09:19

## 2023-12-03 RX ADMIN — IBUPROFEN 600 MG: 600 TABLET, FILM COATED ORAL at 23:16

## 2023-12-03 RX ADMIN — ENOXAPARIN SODIUM 40 MG: 100 INJECTION SUBCUTANEOUS at 04:48

## 2023-12-03 RX ADMIN — IBUPROFEN 600 MG: 600 TABLET, FILM COATED ORAL at 16:18

## 2023-12-03 RX ADMIN — NIFEDIPINE 60 MG: 60 TABLET, FILM COATED, EXTENDED RELEASE ORAL at 07:12

## 2023-12-03 RX ADMIN — NIFEDIPINE 30 MG: 30 TABLET, FILM COATED, EXTENDED RELEASE ORAL at 18:18

## 2023-12-03 RX ADMIN — ACETAMINOPHEN 975 MG: 325 TABLET ORAL at 16:18

## 2023-12-03 RX ADMIN — ACETAMINOPHEN 975 MG: 325 TABLET ORAL at 23:16

## 2023-12-03 ASSESSMENT — PAIN SCALES - GENERAL
PAINLEVEL_OUTOF10: 0 - NO PAIN
PAINLEVEL_OUTOF10: 2
PAINLEVEL_OUTOF10: 2
PAINLEVEL_OUTOF10: 0 - NO PAIN

## 2023-12-03 ASSESSMENT — PAIN DESCRIPTION - DESCRIPTORS
DESCRIPTORS: CRAMPING
DESCRIPTORS: CRAMPING

## 2023-12-03 ASSESSMENT — PAIN - FUNCTIONAL ASSESSMENT: PAIN_FUNCTIONAL_ASSESSMENT: 0-10

## 2023-12-03 NOTE — LACTATION NOTE
Lactation Consultant Note  Lactation Consultation  Reason for Consult: Follow-up assessment, NICU baby, Late  infant, Infant < 6lbs  Consultant Name: BRIANNA Juarez    Maternal Information  Infant to breast within first 2 hours of birth?: No  Breastfeeding Delayed Due to: Infant status    Maternal Assessment  Breast Assessment:  (deferred)  Nipple Assessment:  (deferred)    Infant Assessment  Infant Behavior:  (infant in NICU)    Feeding Assessment       LATCH TOOL       Breast Pump  Pump: Hospital grade electric pump, Double breast pumping  Frequency: 8-10 times per day  Duration: 15-20 minutes per session  Breast Shield Size and Type: 24 mm  Volume of Milk Production: 85  Units of Volume: mL per session    Other OB Lactation Tools       Patient Follow-up       Other OB Lactation Documentation  Maternal Risk Factors: Hypertension, Preeclampsia,  delivery <37 weeks  Infant Risk Factors: Prematurity <37 weeks    Recommendations/Summary    I met briefly with this experienced breastfeeding mother who is pumping for her infant in the NICU. She reports consistently pumping with an increase in milk production. She states that she was able to collect 85cc's pumped breast milk at the last pumping session. She anticipates discharge to home tomorrow and denies any breastfeeding questions or concerns. She has a breast pump for home and declines participation in the [employer] program. She is offered lactation assistance as needed.

## 2023-12-03 NOTE — CARE PLAN
The patient's goals for the shift include to have a bowel movement    The clinical goals for the shift include for patient to maintain stable BP <160/110 with no s/s sPEC noted overnight    Patient did well overnight with no significant changes. BP's stable with no s/s PEC noted and patient has not complained of any pain. Patient has not had a bowel movement yet but took miralax last night. Resting at this time. Will continue to monitor for any changes.

## 2023-12-03 NOTE — PROGRESS NOTES
Postpartum Progress Note    Assessment/Plan   Ligia Carlin is a 28 y.o., , who delivered at 35w1d gestation and is now postpartum day 3.    s/p  on   - continue routine postpartum care  - pain well controlled on po medications  - dvt risk score  5 , for ppx lovenox     siPEC w/ SF   - diagnosed by severe range Bps requiring IV tx   - last IV treatment at OSH   - Nifed 60--> 90--> 120 12/3 for high mild and persistent low mild BP  - BP regimen: nifedipine 60 BID  - currently asymptomatic   - HELLP labs neg x2  - s/p Mg  - BP cuff for home     Breastfeeding  - breastfeeding  - lactation consult PRN      Contraception  - partner vasectomy, POPs for bridge  - We discussed the ability to become pregnant in the absence of regular menses. Pt verbalized understanding.      Dispo  - Anticipate DC PPD4 pending BP control.  - The signs and symptoms of PEC were reviewed with the patient, including unrelenting headache, vision changes/blurred vision, and pain underneath the right breast.   - BP cuff for home for checking BP BID. Pt instructed to call primary OB if SBP > 160 or DBP > 110.  - On discharge, follow up with primary OB in 2-5 days for BP check, 2 weeks for incision check, and 4-6 weeks for postpartum visit.       Yvonne Figueroa, APRN-CNP     Principal Problem:    Severe pre-eclampsia in third trimester    Pregnancy Problems (from 23 to present)       Problem Noted Resolved    Hypertension affecting pregnancy in third trimester 2023 by Diaz Weldon MD No    Priority:  Medium      Severe pre-eclampsia in third trimester 2023 by Althea Vogt MD No    Priority:  Medium            Hospital course: postpartum preeclampsia/eclampsia  Vaginal Birth  Patient is currently breastfeedingThe patient's blood type is O POS. The baby's blood type is O POS . Rhogam is not indicated.    Subjective   Her pain is well controlled with current medications  She is passing flatus  She is  "ambulating well  She is tolerating a Adult diet Regular  She reports no breast or nursing problems  She denies emotional concerns today   Her plan for contraception is POPs     Denies HA, SOB, RUQ pain, vision changes.   Tearful about missing 2 year old    Objective   Allergies:   Patient has no known allergies.         Last Vitals:  Temp Pulse Resp BP MAP Pulse Ox   36.8 °C (98.2 °F) 79 18 (!) 144/92   98 %     Vitals Min/Max Last 24 Hours:  Temp  Min: 36.3 °C (97.3 °F)  Max: 37 °C (98.6 °F)  Pulse  Min: 72  Max: 82  Resp  Min: 18  Max: 18  BP  Min: 127/80  Max: 155/89    Intake/Output:   No intake or output data in the 24 hours ending 12/03/23 1657    Physical Exam:  General: Examination reveals a well developed, well nourished, female, in no acute distress. She is alert and cooperative.  Lungs: symmetrical, non-labored breathing.  Cardiac: warm, well-perfused.  Abdomen: soft, non-tender.  Fundus: firm, below umbilicus, and nontender.  Extremities: no redness or tenderness in the calves or thighs.  Neurological: alert, oriented, normal speech, no focal findings or movement disorder noted.     Lab Data:  No results found for: \"WBC\", \"HGB\", \"HCT\", \"PLT\"  "

## 2023-12-04 ENCOUNTER — PHARMACY VISIT (OUTPATIENT)
Dept: PHARMACY | Facility: CLINIC | Age: 28
End: 2023-12-04
Payer: COMMERCIAL

## 2023-12-04 VITALS
OXYGEN SATURATION: 100 % | TEMPERATURE: 98.2 F | WEIGHT: 242.51 LBS | HEIGHT: 67 IN | RESPIRATION RATE: 16 BRPM | DIASTOLIC BLOOD PRESSURE: 88 MMHG | SYSTOLIC BLOOD PRESSURE: 134 MMHG | BODY MASS INDEX: 38.06 KG/M2 | HEART RATE: 80 BPM

## 2023-12-04 PROBLEM — O14.10 SEVERE PRE-ECLAMPSIA, ANTEPARTUM (HHS-HCC): Status: ACTIVE | Noted: 2023-11-30

## 2023-12-04 PROCEDURE — 2500000001 HC RX 250 WO HCPCS SELF ADMINISTERED DRUGS (ALT 637 FOR MEDICARE OP): Performed by: NURSE PRACTITIONER

## 2023-12-04 PROCEDURE — 96372 THER/PROPH/DIAG INJ SC/IM: CPT | Performed by: STUDENT IN AN ORGANIZED HEALTH CARE EDUCATION/TRAINING PROGRAM

## 2023-12-04 PROCEDURE — 99231 SBSQ HOSP IP/OBS SF/LOW 25: CPT | Performed by: NURSE PRACTITIONER

## 2023-12-04 PROCEDURE — RXMED WILLOW AMBULATORY MEDICATION CHARGE

## 2023-12-04 PROCEDURE — 2500000004 HC RX 250 GENERAL PHARMACY W/ HCPCS (ALT 636 FOR OP/ED): Performed by: STUDENT IN AN ORGANIZED HEALTH CARE EDUCATION/TRAINING PROGRAM

## 2023-12-04 PROCEDURE — 2500000004 HC RX 250 GENERAL PHARMACY W/ HCPCS (ALT 636 FOR OP/ED)

## 2023-12-04 RX ORDER — LABETALOL 100 MG/1
200 TABLET, FILM COATED ORAL EVERY 8 HOURS
Status: DISCONTINUED | OUTPATIENT
Start: 2023-12-04 | End: 2023-12-04 | Stop reason: HOSPADM

## 2023-12-04 RX ORDER — LABETALOL 200 MG/1
200 TABLET, FILM COATED ORAL 2 TIMES DAILY
Qty: 60 TABLET | Refills: 0 | Status: SHIPPED | OUTPATIENT
Start: 2023-12-04 | End: 2023-12-04 | Stop reason: SDUPTHER

## 2023-12-04 RX ORDER — LABETALOL 200 MG/1
200 TABLET, FILM COATED ORAL EVERY 8 HOURS
Qty: 90 TABLET | Refills: 1
Start: 2023-12-04 | End: 2024-02-02

## 2023-12-04 RX ADMIN — ENOXAPARIN SODIUM 40 MG: 100 INJECTION SUBCUTANEOUS at 03:45

## 2023-12-04 RX ADMIN — NIFEDIPINE 60 MG: 60 TABLET, FILM COATED, EXTENDED RELEASE ORAL at 09:01

## 2023-12-04 RX ADMIN — LABETALOL HYDROCHLORIDE 200 MG: 100 TABLET, FILM COATED ORAL at 16:16

## 2023-12-04 RX ADMIN — LABETALOL HYDROCHLORIDE 200 MG: 100 TABLET, FILM COATED ORAL at 09:01

## 2023-12-04 ASSESSMENT — PAIN SCALES - GENERAL
PAINLEVEL_OUTOF10: 0 - NO PAIN
PAINLEVEL_OUTOF10: 0 - NO PAIN

## 2023-12-04 NOTE — LACTATION NOTE
Lactation Consultant Note  Lactation Consultation       Maternal Information       Maternal Assessment       Infant Assessment       Feeding Assessment       LATCH TOOL       Breast Pump       Other OB Lactation Tools       Patient Follow-up       Other OB Lactation Documentation       Recommendations/Summary  Attempted to see patient regarding breast feeding/ pumping, but, Mom was not in the room on Mac 3.   Letter left.

## 2023-12-04 NOTE — LACTATION NOTE
This note was copied from a baby's chart.  Lactation Consultant Note    Recommendations/Summary  I spoke with parents at pt's bedside to inform them of [facility] Lactation Support group meeting and provided written invitation.  Mom is getting over 4 ounces of milk with each pumping effort.  She has no questions regarding pumping at this time. She was invited to follow up with LC services as needed.

## 2023-12-04 NOTE — PROGRESS NOTES
Social Work Assessment      Patient:  Ligia Carlin  Phone: [phone]      Referral Reason: NICU admission      Prenatal Care: Routine PNC through [facility]  Barriers: None     Other Children: none     FOB:  Rhys Carlin  Household Composition: family of 4, infant (Shruthi), mom, dad, and 1 yo toddler     Supports: Extended family, friends     IPV/DV or Safety Concerns: None     Car-Seat:  Yes  Safe Sleep Space:  Yes     Transportation Concerns: denies     School/Work/Income:  Parents both work FT, mom is on leave, dad has flexibility. Denied any financial concerns     Insurance: Private insurance, infant will go on dad's insurance.  Provided information on [facility], which parents appreciated and plan to complete     Substance Use History: None, denies     Mental Health Diagnoses/Concerns: none reported, coping well given stressful circumstances  Medication(s): none  Counseling: none     Assessment:  Mom was admitted at 35 wga with hypertension, infant transferred to the NICU for further care due to prematurity and needing oxygen support.  SW met with parents at bedside to assess for needs and provide support.  Family seems to be coping well, bonding with infant Shruthi.     Reviewed [facility] brochure and application with parents, parents intend to apply. Parents confirmed they have everything for baby at home.      Plan: No additional needs at this time. Plan to utilize Dr. Jorgito Aquino with [facility] for pediatric care.  SW will remain available throughout hospitalization additional support, assessment, and discharge planning as needed, please contact SW if needs noted.     Signature:  AMMY Lo

## 2023-12-04 NOTE — CARE PLAN
The patient's goals for the shift include for my BP's to improve so I can go home!    The clinical goals for the shift include to allow patient to get some sleep    Patient was able to get some sleep overnight and her BP's throughout the night improved. No s/s PEC noted and pain mild/well controlled. Patient meeting all other PP milestones and appears to have good support. Will continue to monitor for any changes.

## 2023-12-04 NOTE — DISCHARGE SUMMARY
Discharge Summary    Admission Date: 2023  Discharge Date: 23  Discharge Diagnosis: Severe pre-eclampsia;     Patient Active Problem List   Diagnosis    Severe pre-eclampsia, antepartum    Cystitis, acute    Hypertension affecting pregnancy in third trimester    UTI symptoms     (normal spontaneous vaginal delivery)       Hospital Course  Ligia Carlin is a 28 y.o.,     Initially presented for: new siPEC w/ SF -> IOL.     Admission Date: 2023    Delivery Date: 2023  1:23 PM     Delivery type: Vaginal, Spontaneous      GA at delivery: 35w1d    Outcome: Living     Anesthesia during delivery: Epidural     Intrapartum complications: None     Feeding method: Breastfeeding Status: Yes    Contraception: POPs We discussed the need to take the pill at exactly the same time everyday and if >2hrs late a backup method must be used for 1 week. Pt verbalized understanding.  and Partner vasectomy    Rhogam: The patient's blood type is O POS. The baby's blood type is O POS . Rhogam is not indicated.     Now postpartum day: 4.    Hospital course n/f:    siPEC w/ SF   - diagnosed by severe range Bps requiring IV tx   - last IV treatment at OSH   - BP regimen: nifedipine 60 BID + lab 200 q8 added today for persistent mild range BP's overnight  - remains asymptomatic   - HELLP labs neg x2  - s/p Mg  - BP cuff for home    PP course otherwise uneventful.  Meeting all postpartum milestones- ambulating independently, passing flatus, tolerating PO intake, lochia light, voiding spontaneously, and pain well controlled with PO meds.     Dispo  OK for DC today, BP check in 2-5 days @ Mac 1200  6 week postpartum follow-up with prenatal provider.     Pertinent Physical Exam At Time of Discharge  General: well appearing, well nourished, postpartum  Obstetric: fundus firm below umbilicus, lochia light  Skin: Warm, dry; no rashes/lesions/erythema  Breast: No masses, nipple discharge  Neuro: A/Ox3,  conversational, no gross motor deficit   GI: no distension, appropriately tender, soft, +BS  Respiratory: Even and unlabored on RA, LSCTA BL  Cardiovascular: Trace BLE edema; No erythema, warmth  Psych: appropriate mood and affect       Your medication list        START taking these medications        Instructions Last Dose Given Next Dose Due   acetaminophen 325 mg tablet  Commonly known as: Tylenol      Take 3 tablets (975 mg) by mouth every 6 hours if needed for mild pain (1 - 3).        mg tablet  Generic drug: ibuprofen      Take 1 tablet (600 mg) by mouth every 6 hours if needed for mild pain (1 - 3).       labetalol 200 mg tablet  Commonly known as: Normodyne      Take 1 tablet (200 mg) by mouth every 8 hours.       norethindrone 0.35 mg tablet  Commonly known as: Micronor      Take 1 tablet (0.35 mg) over 28 days by mouth once daily.       polyethylene glycol 17 gram/dose powder  Commonly known as: Glycolax, Miralax      Take 17 g (1 capful mixed in 8 ounces of water or juice) by mouth 2 times a day as needed (constipation).              CHANGE how you take these medications        Instructions Last Dose Given Next Dose Due   NIFEdipine ER 60 mg 24 hr tablet  Commonly known as: Adalat CC  What changed:   medication strength  how much to take  when to take this  additional instructions      Take 1 tablet (60 mg) by mouth every 12 hours. Do not crush, chew, or split. Do not start before December 4, 2023.              STOP taking these medications      aspirin 81 mg EC tablet                  Where to Get Your Medications        These medications were sent to [facility]  [address] [place] [id number]      Hours: 8:30 AM to 5 PM Mon-Fri Phone: [phone]   acetaminophen 325 mg tablet   mg tablet  NIFEdipine ER 60 mg 24 hr tablet  norethindrone 0.35 mg tablet  polyethylene glycol 17 gram/dose powder       Information about where to get these medications is not yet  available    Ask your nurse or doctor about these medications  labetalol 200 mg tablet           Outpatient Follow-Up  No future appointments.    Faye Ho, APRN-CNP

## 2023-12-04 NOTE — CARE PLAN
The patient's goals for the shift include to be discharged if BP's are stable    The clinical goals for the shift include stable BP's      Problem: Discharge Planning  Goal: Discharge to home or other facility with appropriate resources  Outcome: Adequate for Discharge     Problem: Postpartum  Goal: Experiences normal postpartum course  Outcome: Adequate for Discharge  Goal: Appropriate maternal -  bonding  Outcome: Adequate for Discharge  Goal: Establish and maintain infant feeding pattern for adequate nutrition  Outcome: Adequate for Discharge  Goal: Incisions, wounds, or drain sites healing without S/S of infection  Outcome: Adequate for Discharge  Goal: No s/sx infection  Outcome: Adequate for Discharge  Goal: No s/sx of hemorrhage  Outcome: Adequate for Discharge  Goal: Minimal s/sx of HDP and BP<160/110  Outcome: Adequate for Discharge

## 2023-12-05 LAB
LABORATORY COMMENT REPORT: NORMAL
PATH REPORT.FINAL DX SPEC: NORMAL
PATH REPORT.GROSS SPEC: NORMAL
PATH REPORT.RELEVANT HX SPEC: NORMAL
PATH REPORT.TOTAL CANCER: NORMAL

## 2023-12-05 NOTE — SIGNIFICANT EVENT
Follow-up Phone Call Note:   Interview:  Care Type: Women's Health   Phone Number Called:  [phone]   Call Outcome: left a message   Date/Time Of Call: 12/5/23 @ 1214   Call Back Done By: care coordinator   Callback Complete:  Yes        Follow-up Phone Call Note:   Interview:  Care Type: Women's Health   Phone Number Call  [phone]   Call Outcome: Connected with patient   Patient Reports Feeling (symptoms) Are: better   Patient Has a Primary Care Provider:     Yes   Post-hospitalization Follow-up Occurred According To Schedule:    No   Reason: appointment not scheduled, encouraged patient to call for an appointment    Delivered Baby(ies): Yes   Chest Pain:  No   Shortness of breath or difficulty breathing:  No   Seizures:  No   Any thoughts of hurting yourself or your baby:   No   Bleeding that is soaking through one pad/hour or blood clots the size of an egg or bigger:  No   Incision that is not healing:  No   Red or swollen leg that is painful or warm to the touch:  No   Temperature of 100.4F or higher:  No   Headache that does not get better, even after taking medicine, or a bad headache with vision changes:  No   Where or in what is your baby sleeping?: NICU   Date/Time Of Call: 12/5/23 @ 1414   Call Back Done By: care coordinator   Callback Complete:  Yes

## 2025-05-02 PROCEDURE — 88175 CYTOPATH C/V AUTO FLUID REDO: CPT

## 2025-05-05 ENCOUNTER — LAB REQUISITION (OUTPATIENT)
Dept: LAB | Facility: HOSPITAL | Age: 30
End: 2025-05-05
Payer: COMMERCIAL

## 2025-05-05 DIAGNOSIS — Z01.419 ENCOUNTER FOR GYNECOLOGICAL EXAMINATION (GENERAL) (ROUTINE) WITHOUT ABNORMAL FINDINGS: ICD-10-CM

## 2025-05-05 DIAGNOSIS — Z11.51 ENCOUNTER FOR SCREENING FOR HUMAN PAPILLOMAVIRUS (HPV): ICD-10-CM

## 2025-05-19 LAB
CYTOLOGY CMNT CVX/VAG CYTO-IMP: NORMAL
LAB AP HPV GENOTYPE QUESTION: YES
LAB AP HPV HR: NORMAL
LABORATORY COMMENT REPORT: NORMAL
PATH REPORT.TOTAL CANCER: NORMAL